# Patient Record
Sex: MALE | Race: WHITE | NOT HISPANIC OR LATINO | Employment: OTHER | URBAN - METROPOLITAN AREA
[De-identification: names, ages, dates, MRNs, and addresses within clinical notes are randomized per-mention and may not be internally consistent; named-entity substitution may affect disease eponyms.]

---

## 2021-11-18 LAB
NONINV COLON CA DNA+OCC BLD SCRN STL QL: NEGATIVE
NONINV COLON CA DNA+OCC BLD SCRN STL-IMP: NORMAL

## 2023-07-31 PROBLEM — F32.A DEPRESSION: Status: ACTIVE | Noted: 2023-07-31

## 2023-07-31 PROBLEM — F02.80 ALZHEIMER DISEASE (MULTI): Status: ACTIVE | Noted: 2023-07-31

## 2023-07-31 PROBLEM — H90.3 SENSORINEURAL HEARING LOSS, BILATERAL: Status: ACTIVE | Noted: 2023-07-31

## 2023-07-31 PROBLEM — I25.10 CAD IN NATIVE ARTERY: Status: ACTIVE | Noted: 2023-07-31

## 2023-07-31 PROBLEM — G30.9 ALZHEIMER DISEASE (MULTI): Status: ACTIVE | Noted: 2023-07-31

## 2023-07-31 PROBLEM — Z98.1 STATUS POST LUMBAR SPINAL FUSION: Status: ACTIVE | Noted: 2023-07-31

## 2023-07-31 PROBLEM — K22.4 NUTCRACKER ESOPHAGUS: Status: ACTIVE | Noted: 2023-07-31

## 2023-07-31 PROBLEM — M48.07 FORAMINAL STENOSIS OF LUMBOSACRAL REGION: Status: ACTIVE | Noted: 2023-07-31

## 2023-07-31 RX ORDER — NITROGLYCERIN 0.4 MG/1
TABLET SUBLINGUAL
COMMUNITY
Start: 2016-07-20

## 2023-07-31 RX ORDER — MEMANTINE HYDROCHLORIDE 10 MG/1
1 TABLET ORAL DAILY
COMMUNITY
Start: 2017-12-13

## 2023-07-31 RX ORDER — MULTIVITAMIN
1 TABLET ORAL DAILY
COMMUNITY
Start: 2019-09-03

## 2023-07-31 RX ORDER — DONEPEZIL HYDROCHLORIDE 10 MG/1
1 TABLET, FILM COATED ORAL NIGHTLY
COMMUNITY
Start: 2021-05-20 | End: 2023-08-01 | Stop reason: WASHOUT

## 2023-07-31 RX ORDER — CHOLECALCIFEROL (VITAMIN D3) 50 MCG
1 TABLET ORAL DAILY
COMMUNITY
Start: 2020-03-03

## 2023-07-31 RX ORDER — ATORVASTATIN CALCIUM 40 MG/1
1 TABLET, FILM COATED ORAL DAILY
COMMUNITY
Start: 2016-01-27 | End: 2024-03-04

## 2023-08-01 ENCOUNTER — OFFICE VISIT (OUTPATIENT)
Dept: PRIMARY CARE | Facility: CLINIC | Age: 78
End: 2023-08-01
Payer: MEDICARE

## 2023-08-01 VITALS
TEMPERATURE: 97.4 F | HEART RATE: 62 BPM | SYSTOLIC BLOOD PRESSURE: 125 MMHG | BODY MASS INDEX: 23.48 KG/M2 | WEIGHT: 141.13 LBS | DIASTOLIC BLOOD PRESSURE: 80 MMHG

## 2023-08-01 DIAGNOSIS — I70.309: ICD-10-CM

## 2023-08-01 DIAGNOSIS — G30.9 ALZHEIMER DISEASE (MULTI): ICD-10-CM

## 2023-08-01 DIAGNOSIS — F02.80 ALZHEIMER DISEASE (MULTI): ICD-10-CM

## 2023-08-01 DIAGNOSIS — F32.A DEPRESSION, UNSPECIFIED DEPRESSION TYPE: ICD-10-CM

## 2023-08-01 DIAGNOSIS — I20.1 PRINZMETAL'S ANGINA (CMS-HCC): ICD-10-CM

## 2023-08-01 DIAGNOSIS — R07.89 ATYPICAL CHEST PAIN: Primary | ICD-10-CM

## 2023-08-01 PROCEDURE — 99214 OFFICE O/P EST MOD 30 MIN: CPT | Performed by: INTERNAL MEDICINE

## 2023-08-01 PROCEDURE — 1160F RVW MEDS BY RX/DR IN RCRD: CPT | Performed by: INTERNAL MEDICINE

## 2023-08-01 PROCEDURE — 1157F ADVNC CARE PLAN IN RCRD: CPT | Performed by: INTERNAL MEDICINE

## 2023-08-01 PROCEDURE — 1126F AMNT PAIN NOTED NONE PRSNT: CPT | Performed by: INTERNAL MEDICINE

## 2023-08-01 PROCEDURE — 93000 ELECTROCARDIOGRAM COMPLETE: CPT | Performed by: INTERNAL MEDICINE

## 2023-08-01 PROCEDURE — 1036F TOBACCO NON-USER: CPT | Performed by: INTERNAL MEDICINE

## 2023-08-01 PROCEDURE — 1159F MED LIST DOCD IN RCRD: CPT | Performed by: INTERNAL MEDICINE

## 2023-08-01 RX ORDER — DONEPEZIL HYDROCHLORIDE 5 MG/1
1 TABLET, FILM COATED ORAL NIGHTLY
COMMUNITY
Start: 2021-05-20 | End: 2023-12-05 | Stop reason: ALTCHOICE

## 2023-08-01 NOTE — ASSESSMENT & PLAN NOTE
Known history of vasospastic angina, not currently on medical management.  We will follow-up, consider possible calcium channel blocker

## 2023-08-01 NOTE — ASSESSMENT & PLAN NOTE
In the setting of advancing Alzheimer's and inability to complete tasks he previously done before.  We will discuss with Dr. Curran

## 2023-08-01 NOTE — ASSESSMENT & PLAN NOTE
With significant worsening of his symptoms postsurgery in 2019.  Followed by Dr. Curran last seen in November with possible concomitant and worsening depressive symptoms.  We will reach out to Dr. Curran to determine about potential management strategies.  There are no concerns about behavioral disturbances at present.

## 2023-08-01 NOTE — ASSESSMENT & PLAN NOTE
With now atypical chest pain.  EKG largely unchanged from prior.  Will follow-up with his cardiologist to determine recommended neck steps after discussed consideration for stress testing.

## 2023-08-01 NOTE — PROGRESS NOTES
"Subjective   Patient ID: Darius Navarrete is a 78 y.o. male who presents for NEW PT VISIT .  HPI  78-year-old male prior patient of Dr. Maldonado here for establishment of care, last seen in December.  - Intermittently points to his chest and reports pain int he region but unclear if gas pains as improves after burping. Denies any significant associated symptoms including palpitations, shortness of breath. Gets intermittent dyspnea on exertion, though hard to know what is causing the discomfort on walking given constantly complaining of various aches and pains on exertion. He had cardiac cath in 2016 notable for nonobstructive CAD in a right dominant system, patent stent in distal RCA and significant coronary vasospasm involving D2 and RPDA.   - Gets intermittent toe pain, has had xrays notable for arthritis, gets intermittent pains that are nonsepcific and nonfocal, trouble discerning the cause of his pain.   - Depression - has been noting to be saying \"I am not who I used to be\", and \"I do not like who I am\", has noting to be worsening over the past 6-12 months.     PMHx:  -Alzheimer's disease-on donepezil 10 and memantine 10 once  daily (reduced to once daily from twice a day due to possible side effects)-followed by Dr. Donaldson last seen in November recommended 1 year follow-up.  Has issues with getting his words in and getting thoughts across. Enrolled in Alzheimer's study, has had imaging  -CAD - s/p drug-eluting stent to the RCA 2/11, mild disease of LAD and LCX-on aspirin, atorvastatin 40, last LDL 46 40 followed by Dr. Norris last seen in December   -Vasospastic angina given nitroglycerin improved after discontinuation of two of his medications after his stent.   -Lumbar spinal stenosis status post surgery in 2019 complicated by cecal perforation requiring open ileocecectomy with diverting loop ileostomy and peritoneal lavage as well as colocutaneous fistula status post debridement and subsequent delayed " closure with resolution. This extensively worsening his dementia   -Status post bilateral carpal tunnel decompression surgery    Social:   - Showers, dresses himself, eats, good with motor activity, likes to help in the house. [facility] program [email or web address] [employer] one day a week. Grocery shops, goes walking. Gets a few hours once a week to help.   - Lives at home with wife and 14.5 year old dog.   - 2 children live in town - one [place] and one [place] side.   Current Outpatient Medications   Medication Instructions    aspirin 81 mg capsule 1 tablet, oral, Daily    atorvastatin (Lipitor) 40 mg tablet 1 tablet, oral, Daily    cholecalciferol (Vitamin D-3) 50 MCG (2000 UT) tablet 1 tablet, oral, Daily    donepezil (Aricept) 5 mg tablet 1 tablet, oral, Nightly    memantine (Namenda) 10 mg tablet 1 tablet, oral, Daily    multivitamin (Multiple Vitamins) tablet 1 tablet, oral, Daily    nitroglycerin (Nitrostat) 0.4 mg SL tablet sublingual        Objective     /80   Pulse 62   Temp 36.3 °C (97.4 °F)   Wt 64 kg (141 lb 2 oz)   BMI 23.48 kg/m²     Physical Exam  General: Appears comfortable, NAD, appropriate affect, accompanied by his wife   HEENT: NCAT, EOMI, pupils symmetric, no conjunctival erythema   Heart: RRR S1 S2 no murmurs appreciated   Lungs: CTA bilaterally, no rhonchi, rales, or wheezes   Abdomen: Soft, NT/ND, no rebound or guarding, NABS   Extremities: no cyanosis or edema appreciated  Neuro: Intermittently inappropriately responding to questions, word finding difficulties      Assessment/Plan   Problem List Items Addressed This Visit       Alzheimer disease (CMS/HCC)     With significant worsening of his symptoms postsurgery in 2019.  Followed by Dr. Curran last seen in November with possible concomitant and worsening depressive symptoms.  We will reach out to Dr. Curran to determine about potential management strategies.  There are no concerns about behavioral disturbances at present.          Depression     In the setting of advancing Alzheimer's and inability to complete tasks he previously done before.  We will discuss with Dr. Curran         Atherosclerosis of bypass graft of extremity, unspecified extremity, with unspecified presence of clinical manifestation (CMS/HCC)     With now atypical chest pain.  EKG largely unchanged from prior.  Will follow-up with his cardiologist to determine recommended neck steps after discussed consideration for stress testing.         Prinzmetal's angina (CMS/HCC)     Known history of vasospastic angina, not currently on medical management.  We will follow-up, consider possible calcium channel blocker         Relevant Medications    nitroglycerin (Nitrostat) 0.4 mg SL tablet     Other Visit Diagnoses       Atypical chest pain    -  Primary    Relevant Orders    ECG 12 lead (Clinic Performed)            Health Maintenance   Cancer screening:   - Colonoscopy 8/15  Immunizations to discuss at next visit

## 2023-08-28 ENCOUNTER — PATIENT OUTREACH (OUTPATIENT)
Dept: PRIMARY CARE | Facility: CLINIC | Age: 78
End: 2023-08-28
Payer: MEDICARE

## 2023-08-28 DIAGNOSIS — K56.609 SBO (SMALL BOWEL OBSTRUCTION) (MULTI): ICD-10-CM

## 2023-08-28 RX ORDER — POLYETHYLENE GLYCOL 3350 17 G/17G
17 POWDER, FOR SOLUTION ORAL DAILY
COMMUNITY
End: 2023-12-05 | Stop reason: ALTCHOICE

## 2023-08-28 NOTE — PROGRESS NOTES
Discharge Facility: [facility] [id number]  Discharge Diagnosis: Abdominal pain, Gastritis, SBO  Admission Date: 8-  Discharge Date: 8-    PCP Appointment Date: No available TCM appointments.  will outreach the clinical pool /office for scheduling assistance    Specialist Appointment Date: JENNA    Hospital Encounter and Summary: Linked   See discharge assessment below for further details  Engagement  Call Start Time: 1325 (8/28/2023  1:25 PM)    Medications  Medications reviewed with patient/caregiver?: Yes (8/28/2023  1:25 PM)  Is the patient having any side effects they believe may be caused by any medication additions or changes?: No (8/28/2023  1:25 PM)  Does the patient have all medications ordered at discharge?: Yes (8/28/2023  1:25 PM)  Prescription Comments: MiraLax oral powder for reconstitution - 17 gram(s) orally 3 times a day, As Needed -Constipation - for constipation (8/28/2023  1:25 PM)  Is the patient taking all medications as directed (includes completed medication regime)?: Yes (8/28/2023  1:25 PM)  Care Management Interventions: Provided patient education (8/28/2023  1:25 PM)  Medication Comments: Denies any  questions or concerns about their medications once they are home. Verbalizes an understanding regarding how to take their medications and which medications they should be taking. (8/28/2023  1:25 PM)    Appointments  Does the patient have a primary care provider?: Yes (No available TCM appointments.  will outreach the clinical pool /office for scheduling assistance) (8/28/2023  1:25 PM)  Care Management Interventions: Advised patient to make appointment (Outreach to office clinical pool for scheduling) (8/28/2023  1:25 PM)  Has the patient kept scheduled appointments due by today?: Yes (8/28/2023  1:25 PM)  Care Management Interventions: Educated on importance of keeping appointment (8/28/2023  1:25 PM)    Self Management  What is the home health agency?: NA (8/28/2023  1:25 PM)  Has home health  "visited the patient within 72 hours of discharge?: Not applicable (2023  1:25 PM)  What Durable Medical Equipment (DME) was ordered?: NA (2023  1:25 PM)    Patient Teaching  Does the patient have access to their discharge instructions?: Yes (spoke with wife) (2023  1:25 PM)  Care Management Interventions: Reviewed instructions with patient (2023  1:25 PM)  What is the patient's perception of their health status since discharge?: Improving (2023  1:25 PM)  Is the patient/caregiver able to teach back the hierarchy of who to call/visit for symptoms/problems? PCP, Specialist, Home Health nurse, Urgent Care, ED, 911: Yes (You need to call your doctor,  return to the closest ED if you develop any new  or worsening symptoms:  concerning symptoms  call 911 for emergency situations  Call your doctor for questions / concerrns.) (2023  1:25 PM)    Wrap Up  Wrap Up Additional Comments: Spoke w/ pt's wife. Pt identified by name and . Darius is doing well. Has moved his bowels. Denies noting blood or evidence of bleeding. Encouraged to monitor. Appetite is ok \" not back to normal but ok\" Notes decreased hiccouphing, some belching. Unsure if passing flatus. Activity / walking encouraged. denies fevers, chills, abdominal pain or complaints of nauesa.  Reviewed discharge instructions, medications, and follow up.  Patient denies any further discharge questions/needs at this time.  Encouraged fu with PCP. No available TCM will outreach the office to schedule. (2023  1:25 PM)  Call End Time: 1338 (2023  1:25 PM)          "

## 2023-08-29 ENCOUNTER — PATIENT OUTREACH (OUTPATIENT)
Dept: PRIMARY CARE | Facility: CLINIC | Age: 78
End: 2023-08-29
Payer: MEDICARE

## 2023-08-29 DIAGNOSIS — F02.80 ALZHEIMER DISEASE (MULTI): ICD-10-CM

## 2023-08-29 DIAGNOSIS — F32.A DEPRESSION, UNSPECIFIED DEPRESSION TYPE: ICD-10-CM

## 2023-08-29 DIAGNOSIS — G30.9 ALZHEIMER DISEASE (MULTI): ICD-10-CM

## 2023-08-30 ENCOUNTER — PATIENT OUTREACH (OUTPATIENT)
Dept: PRIMARY CARE | Facility: CLINIC | Age: 78
End: 2023-08-30
Payer: MEDICARE

## 2023-08-30 NOTE — PROGRESS NOTES
Pt outreach regarding CCM program, request a call back int he am on the way to an appointment.  Will follow F/U in the am.

## 2023-09-01 ENCOUNTER — PATIENT OUTREACH (OUTPATIENT)
Dept: PRIMARY CARE | Facility: CLINIC | Age: 78
End: 2023-09-01
Payer: MEDICARE

## 2023-09-01 DIAGNOSIS — F02.80 ALZHEIMER DISEASE (MULTI): ICD-10-CM

## 2023-09-01 DIAGNOSIS — G30.9 ALZHEIMER DISEASE (MULTI): ICD-10-CM

## 2023-09-01 DIAGNOSIS — F32.A DEPRESSION, UNSPECIFIED DEPRESSION TYPE: ICD-10-CM

## 2023-09-01 DIAGNOSIS — K56.609 SBO (SMALL BOWEL OBSTRUCTION) (MULTI): ICD-10-CM

## 2023-09-01 PROCEDURE — 99490 CHRNC CARE MGMT STAFF 1ST 20: CPT | Performed by: INTERNAL MEDICINE

## 2023-09-01 ASSESSMENT — ENCOUNTER SYMPTOMS: OCCASIONAL FEELINGS OF UNSTEADINESS: 1

## 2023-09-01 NOTE — PROGRESS NOTES
Call to pt identified by name and , to review CCM program with pt and his wife.  Possible cost sharing reviewed and verbal consent obtained to enroll in CCM.    LOV: 23  NOV: 23    Health Maintenance Due: MAWV    Goals for GERD:   Maintain a healthy weight  Elevate the head of the bed 6-9 inches with pillows or blocks  Avoid foods that exacerbate symptoms such as spicy food or highly acidic foods  Stay upright for 2 hours after meals or before bed  Take medications as prescribed   Avoid carbonated beverages   Drink plenty of water    Spoke with Mrs Navarrete regarding Mr. Navarrete, she states pt recently released from [facility] with SBO, instructed to start using Miralax for pt.  Reviewed that increasing daily intake of water would also assist with constipation.  Pt has Alzheimers and attends the day program at [facility] 2 x week.  Reviewed healthy diet for GERD.      Medications reviewed no refills requested.  Mrs. Navarrete verbalizes understanding and agrees with POC.  Emailed my direct contact information and encouraged to reach out with any healthcare needs.

## 2023-09-06 ENCOUNTER — OFFICE VISIT (OUTPATIENT)
Dept: PRIMARY CARE | Facility: CLINIC | Age: 78
End: 2023-09-06
Payer: MEDICARE

## 2023-09-06 VITALS
BODY MASS INDEX: 23.33 KG/M2 | WEIGHT: 140.2 LBS | SYSTOLIC BLOOD PRESSURE: 100 MMHG | HEART RATE: 64 BPM | TEMPERATURE: 98.9 F | DIASTOLIC BLOOD PRESSURE: 59 MMHG

## 2023-09-06 DIAGNOSIS — G30.9 ALZHEIMER DISEASE (MULTI): ICD-10-CM

## 2023-09-06 DIAGNOSIS — F32.A DEPRESSION, UNSPECIFIED DEPRESSION TYPE: Primary | ICD-10-CM

## 2023-09-06 DIAGNOSIS — K56.609 SMALL BOWEL OBSTRUCTION (MULTI): ICD-10-CM

## 2023-09-06 DIAGNOSIS — F02.80 ALZHEIMER DISEASE (MULTI): ICD-10-CM

## 2023-09-06 PROCEDURE — 1036F TOBACCO NON-USER: CPT | Performed by: INTERNAL MEDICINE

## 2023-09-06 PROCEDURE — 99495 TRANSJ CARE MGMT MOD F2F 14D: CPT | Performed by: INTERNAL MEDICINE

## 2023-09-06 PROCEDURE — 1159F MED LIST DOCD IN RCRD: CPT | Performed by: INTERNAL MEDICINE

## 2023-09-06 PROCEDURE — 1126F AMNT PAIN NOTED NONE PRSNT: CPT | Performed by: INTERNAL MEDICINE

## 2023-09-06 PROCEDURE — 1157F ADVNC CARE PLAN IN RCRD: CPT | Performed by: INTERNAL MEDICINE

## 2023-09-06 PROCEDURE — 1160F RVW MEDS BY RX/DR IN RCRD: CPT | Performed by: INTERNAL MEDICINE

## 2023-09-06 RX ORDER — ISOSORBIDE MONONITRATE 30 MG/1
30 TABLET, EXTENDED RELEASE ORAL
COMMUNITY

## 2023-09-06 RX ORDER — FLUOXETINE 10 MG/1
10 CAPSULE ORAL DAILY
Qty: 30 CAPSULE | Refills: 1 | Status: SHIPPED | OUTPATIENT
Start: 2023-09-06 | End: 2023-09-06 | Stop reason: WASHOUT

## 2023-09-06 NOTE — PROGRESS NOTES
"Patient: Darius Navarrete  : 1945  PCP: Anahi Trivedi DO  MRN: [id number]  Program: No linked episodes     Darius Navarrete is a 78 y.o. male presenting today for follow-up after being discharged from the hospital 13 days ago. The main problem requiring admission was small bowel obstruction.  The discharge summary and/or Transitional Care Management documentation was reviewed. Medication reconciliation was performed as indicated via the \"Lyle as Reviewed\" timestamp.     Darius Navarrete was contacted by Transitional Care Management services two days after his discharge. This encounter and supporting documentation was reviewed.    The complexity of medical decision making for this patient's transitional care is moderate    ED was admitted on  and discharged on  diagnosed with abdominal pain gastritis and small bowel obstruction.  He initially presented with abdominal pain in the right lower quadrant subsequently had possible coffee-ground emesis.  Work-up in the ED showed mild leukocytosis, hyperglycemia to 164.  CT showed no evidence of GI bleed, SBO with transition point in the right lower quadrant-early SBO versus ileus.  Also noted to have thickening of the lesser curvature of the stomach, gastritis/duodenitis.  He was recommended conservative management by the surgical team and repeat imaging showed improvement in symptoms.  He was given MiraLAX.  His blood work was notable for anemia that was mild.  He was switched to prune juice belives to be going ok. Denies abdominal discomfort, nausea, vomiting, no constitution symptoms. Gradually uptitrated diet regimen and overall doing ok.     Concerned regarding persistent low mood, depression with intermittent behavioral disturbances and paranoia 2-3x/week, interested in consideration     PMHx:  -At last visit was complaining of atypical chest pain, subsequently seen by cardiology on  started Imdur 30 mg as well as ordered for " echocardiogram  -Alzheimer's disease-on donepezil 10 and memantine 10 once  daily (reduced to once daily from twice a day due to possible side effects)-followed by Dr. Donaldson.  Has issues with getting his words in and getting thoughts across. Enrolled in Alzheimer's study, has had imaging.  Seen by Dr. Currna in July recommended decreasing Namenda and follow-up in 6 months  -CAD - s/p drug-eluting stent to the RCA 2/11, mild disease of LAD and LCX-on aspirin, atorvastatin 40, last LDL 46 40 followed by Dr. Norris last seen in December   -Vasospastic angina given nitroglycerin improved after discontinuation of two of his medications after his stent.   -Lumbar spinal stenosis status post surgery in 2019 complicated by cecal perforation requiring open ileocecectomy with diverting loop ileostomy and peritoneal lavage as well as colocutaneous fistula status post debridement and subsequent delayed closure with resolution. This extensively worsening his dementia   -Status post bilateral carpal tunnel decompression surgery  -Depression-     Social:   - Showers, dresses himself, eats, good with motor activity, likes to help in the house. [facility] one day a week. Grocery shops, goes walking. Gets a few hours once a week to help.   - Lives at home with wife and 14.5 year old dog.   - 2 children live in town - one on [place] and one on the [place] side    Physical Exam  General: Appears comfortable, NAD, appropriate affect, accompanied by his wife Kelley.  HEENT: NCAT, EOMI, pupils symmetric, no conjunctival erythema   Neck: Supple, no LAD   Heart: RRR S1 S2 no murmurs appreciated   Lungs: CTA bilaterally, no rhonchi, rales, or wheezes   Abdomen: Soft, NT/ND, no rebound or guarding, NABS   Extremities: no cyanosis or edema appreciated  Neuro: AAO x 3, answers questions appropriately, no FND, gait unremarkable       Assessment/Plan   Problem List Items Addressed This Visit       Foraminal stenosis of  lumbosacral region    Depression - Primary  With concerns regarding persistent and/or worsening symptoms of depression as well as paranoia and behavioral disturbances.  Interested in medical management.  We will contact his neurologist Dr. Donaldson to determine best management strategy injury including consideration for initiation of citalopram versus fluoxetine.  Will notify patient.   Update: discussed with Dr. Curran, plan for now is to discontinue donepezil to see if this may be contributing to his depressive symptoms.  If no response after couple of weeks will retreat citalopram.  Reviewed with Kelley over the phone who expressed understanding and agreeable to plan.    Relevant Medications    FLUoxetine (PROzac) 10 mg capsule     Other Visit Diagnoses       Small bowel obstruction (CMS/HCC)       likely secondary to multiple surgeries in the past, has been uptitrating oral regimen with good tolerance.  Has been having adequate bowel movements, appears clinically stable today without alarm features.  Continue bowel regimen as tolerated monitor for worsening symptoms.     Anemia   Mild, noted on discharge labs, will repeat at next visit.     Microscopic hematuria  Incidental finding, will repeat at next visit.       Review of Systems    No family history on file.    Engagement  Call Start Time: 1325 (8/28/2023  1:25 PM)    Medications  Medications reviewed with patient/caregiver?: Yes (8/28/2023  1:25 PM)  Is the patient having any side effects they believe may be caused by any medication additions or changes?: No (8/28/2023  1:25 PM)  Does the patient have all medications ordered at discharge?: Yes (8/28/2023  1:25 PM)  Prescription Comments: MiraLax oral powder for reconstitution - 17 gram(s) orally 3 times a day, As Needed -Constipation - for constipation (8/28/2023  1:25 PM)  Is the patient taking all medications as directed (includes completed medication regime)?: Yes (8/28/2023  1:25 PM)  Care Management  Interventions: Provided patient education (2023  1:25 PM)  Medication Comments: Denies any  questions or concerns about their medications once they are home. Verbalizes an understanding regarding how to take their medications and which medications they should be taking. (2023  1:25 PM)    Appointments  Does the patient have a primary care provider?: Yes (No available TCM appointments.  will outreach the clinical pool /office for scheduling assistance) (2023  1:25 PM)  Care Management Interventions: Advised patient to make appointment (Outreach to office clinical pool for scheduling) (2023  1:25 PM)  Has the patient kept scheduled appointments due by today?: Yes (2023  1:25 PM)  Care Management Interventions: Educated on importance of keeping appointment (2023  1:25 PM)    Self Management  What is the home health agency?: NA (2023  1:25 PM)  Has home health visited the patient within 72 hours of discharge?: Not applicable (2023  1:25 PM)  What Durable Medical Equipment (DME) was ordered?: NA (2023  1:25 PM)    Patient Teaching  Does the patient have access to their discharge instructions?: Yes (spoke with wife) (2023  1:25 PM)  Care Management Interventions: Reviewed instructions with patient (2023  1:25 PM)  What is the patient's perception of their health status since discharge?: Improving (2023  1:25 PM)  Is the patient/caregiver able to teach back the hierarchy of who to call/visit for symptoms/problems? PCP, Specialist, Home Health nurse, Urgent Care, ED, 911: Yes (You need to call your doctor,  return to the closest ED if you develop any new  or worsening symptoms:  concerning symptoms  call 911 for emergency situations  Call your doctor for questions / concerrns.) (2023  1:25 PM)    Wrap Up  Wrap Up Additional Comments: Spoke w/ pt's wife. Pt identified by name and . Darius is doing well. Has moved his bowels. Denies noting blood or evidence  "of bleeding. Encouraged to monitor. Appetite is ok \" not back to normal but ok\" Notes decreased hiccouphing, some belching. Unsure if passing flatus. Activity / walking encouraged. denies fevers, chills, abdominal pain or complaints of nauesa.  Reviewed discharge instructions, medications, and follow up.  Patient denies any further discharge questions/needs at this time.  Encouraged fu with PCP. No available TCM will outreach the office to schedule. (8/28/2023  1:25 PM)  Call End Time: 1338 (8/28/2023  1:25 PM)        No follow-ups on file.    Follow-up as scheduled in November.  "

## 2023-09-12 ENCOUNTER — PATIENT OUTREACH (OUTPATIENT)
Dept: PRIMARY CARE | Facility: CLINIC | Age: 78
End: 2023-09-12
Payer: MEDICARE

## 2023-09-12 DIAGNOSIS — K56.609 SBO (SMALL BOWEL OBSTRUCTION) (MULTI): ICD-10-CM

## 2023-09-12 DIAGNOSIS — K56.609 SMALL BOWEL OBSTRUCTION (MULTI): ICD-10-CM

## 2023-09-12 DIAGNOSIS — F02.80 ALZHEIMER DISEASE (MULTI): ICD-10-CM

## 2023-09-12 DIAGNOSIS — G30.9 ALZHEIMER DISEASE (MULTI): ICD-10-CM

## 2023-09-12 NOTE — PROGRESS NOTES
CCM outreach spoke with pts wife pt identified by name and .    LOV: 23  NOV: 23    Health Maintenance Due: MAWV    Goals for GERD:   Maintain a healthy weight  Elevate the head of the bed 6-9 inches with pillows or blocks  Avoid foods that exacerbate symptoms such as spicy food or highly acidic foods  Stay upright for 2 hours after meals or before bed  Take medications as prescribed   Avoid carbonated beverages   Drink plenty of water    Spoke with pts wife who states pt has been having bowel movements since his hospitalization and has had no further c/o pain in his abdomen.  Pts po intake is good.  Gerd is well controlled on current medications.  Reviewed the importance of adequate water intake to help with bowels and GERD.  Pt continues at [facility] 2x week .      Medications reviewed no refills requested.  Pts wife verbalizes understanding and is in agreement with POC.  Encouraged to reach out to mu direct number for any healthcare needs.

## 2023-09-15 ENCOUNTER — PATIENT OUTREACH (OUTPATIENT)
Dept: PRIMARY CARE | Facility: CLINIC | Age: 78
End: 2023-09-15
Payer: MEDICARE

## 2023-09-15 NOTE — PROGRESS NOTES
Call regarding appt. with PCP on  9- 6-2023 after hospitalization.  At time of outreach call the patient feels as if their condition has improved.  They  deny any questions or concerns regarding  the recovery period  or follow up appointment,  Patient is enrolled in CCM.

## 2023-10-02 ENCOUNTER — TELEPHONE (OUTPATIENT)
Dept: GERIATRIC MEDICINE | Facility: CLINIC | Age: 78
End: 2023-10-02
Payer: MEDICARE

## 2023-10-02 NOTE — TELEPHONE ENCOUNTER
Wife calling to report increased aggresion/aggitation and hallucinations. Pt is getting up in people's faces. Requesting medication for this to [facility]. Currently taking Memantine 10mg at bedtime. No Aricept.

## 2023-10-03 ENCOUNTER — TELEPHONE (OUTPATIENT)
Dept: GERIATRIC MEDICINE | Facility: CLINIC | Age: 78
End: 2023-10-03
Payer: MEDICARE

## 2023-10-03 DIAGNOSIS — F02.80 ALZHEIMER DISEASE (MULTI): Primary | ICD-10-CM

## 2023-10-03 DIAGNOSIS — G30.9 ALZHEIMER DISEASE (MULTI): Primary | ICD-10-CM

## 2023-10-03 RX ORDER — QUETIAPINE FUMARATE 25 MG/1
TABLET, FILM COATED ORAL
Qty: 30 TABLET | Refills: 0 | Status: SHIPPED | OUTPATIENT
Start: 2023-10-03 | End: 2023-10-16 | Stop reason: SDUPTHER

## 2023-10-03 NOTE — TELEPHONE ENCOUNTER
Wife Kelley calling to report pt's increased agitation and aggression.  At times he gets into peoples faces.  Pt is also having visual and auditory hallucinations.  Can you send over a script for something to [facility] [place]?  This message is from yesterday but I am not sure where it went.

## 2023-10-05 ENCOUNTER — TELEPHONE (OUTPATIENT)
Dept: CARDIOLOGY | Facility: HOSPITAL | Age: 78
End: 2023-10-05
Payer: MEDICARE

## 2023-10-05 NOTE — TELEPHONE ENCOUNTER
Per SOLITARIO Benton,    Echocardiogram results:  Normal LV function, mild AI.     Patient's spouse notified.

## 2023-10-11 ENCOUNTER — DOCUMENTATION (OUTPATIENT)
Dept: PRIMARY CARE | Facility: CLINIC | Age: 78
End: 2023-10-11
Payer: MEDICARE

## 2023-10-11 DIAGNOSIS — F32.A DEPRESSION, UNSPECIFIED DEPRESSION TYPE: ICD-10-CM

## 2023-10-11 DIAGNOSIS — F02.80 ALZHEIMER DISEASE (MULTI): ICD-10-CM

## 2023-10-11 DIAGNOSIS — G30.9 ALZHEIMER DISEASE (MULTI): ICD-10-CM

## 2023-10-11 NOTE — PROGRESS NOTES
[facility] outreach with pts wife with permission for pt identified by name and .    LOV: 23  NOV: 23    Health Maintenance Due: MAWV    Goals for GERD:   Maintain a healthy weight  Elevate the head of the bed 6-9 inches with pillows or blocks  Avoid foods that exacerbate symptoms such as spicy food or highly acidic foods  Stay upright for 2 hours after meals or before bed  Take medications as prescribed   Avoid carbonated beverages   Drink plenty of water    Goals for Depression:  Monitor mood and behavior changes.  Administer prescribed antidepressant medication.  Facilitate regular psychotherapy sessions.  Provide education on depression management.  Stick to a daily schedule  Call support group or person   Keep a healthy weight and eat healthy diet options    Spoke with pts wife who states pt is becoming more difficult to manage due to his memory loss.  Pt continues to see Nuerologist and goes to [facility] and to [facility] 2x a week.  Pt becomes frustrated when cannot communicate his thoughts into words and has difficulty expressing his needs verbally.  Currently Pt is at home with his wife and is able tp perform his ADLs independently.  No falls reported by pts wife.      Medications reviewed no refills requested.  Pts wife verbalizes understanding and is in agreement with POC.  Encouraged to reach out on my direct line with any healthcare needs.

## 2023-10-16 DIAGNOSIS — F02.80 ALZHEIMER DISEASE (MULTI): ICD-10-CM

## 2023-10-16 DIAGNOSIS — G30.9 ALZHEIMER DISEASE (MULTI): ICD-10-CM

## 2023-10-17 RX ORDER — QUETIAPINE FUMARATE 25 MG/1
TABLET, FILM COATED ORAL
Qty: 90 TABLET | Refills: 3 | Status: SHIPPED | OUTPATIENT
Start: 2023-10-17

## 2023-10-31 ENCOUNTER — TELEPHONE (OUTPATIENT)
Dept: GERIATRIC MEDICINE | Facility: CLINIC | Age: 78
End: 2023-10-31
Payer: MEDICARE

## 2023-10-31 DIAGNOSIS — F32.89 OTHER DEPRESSION: Primary | ICD-10-CM

## 2023-10-31 NOTE — TELEPHONE ENCOUNTER
Pt's wife Kelley called with questions regarding Seroquel.  Pt has been taking for 1 month now.  When she got the 2nd refill she read the package inserts which stated that elderly with dementia should never take this med.  I have seen this and we were wondering why it says that since we often script it.  Also, why don't we script Rexulti that has been approved?

## 2023-11-01 RX ORDER — SERTRALINE HYDROCHLORIDE 50 MG/1
TABLET, FILM COATED ORAL
Qty: 90 TABLET | Refills: 3 | Status: SHIPPED | OUTPATIENT
Start: 2023-11-01 | End: 2024-01-08 | Stop reason: SDUPTHER

## 2023-11-01 NOTE — TELEPHONE ENCOUNTER
Kelley is wondering if you want the pt to stop the Seroquel?  She is unsure if it is related to why he is talking about death and dying.

## 2023-11-01 NOTE — TELEPHONE ENCOUNTER
I called Kelley back and we discussed the black box warning on all antipsychotic drugs for our elderly folks with dementia.  She then asked if this would contribute to his thoughts about death and dying?  She elaborated and said the pt talks about dying every day and that he is so miserable.  She has not yet asked if he has a plan of how he might harm himself, saying she is afraid to ask.  I let her know that if he is truly feeling that way that he should be taken to the ER.  She responded saying that would not work, he does not do well in hospitals.  She states he is not left home alone.

## 2023-11-07 ENCOUNTER — DOCUMENTATION (OUTPATIENT)
Dept: PRIMARY CARE | Facility: CLINIC | Age: 78
End: 2023-11-07
Payer: MEDICARE

## 2023-11-07 DIAGNOSIS — F32.A DEPRESSION, UNSPECIFIED DEPRESSION TYPE: ICD-10-CM

## 2023-11-07 DIAGNOSIS — F02.80 ALZHEIMER DISEASE (MULTI): ICD-10-CM

## 2023-11-07 DIAGNOSIS — G30.9 ALZHEIMER DISEASE (MULTI): ICD-10-CM

## 2023-11-07 PROCEDURE — 99490 CHRNC CARE MGMT STAFF 1ST 20: CPT | Performed by: INTERNAL MEDICINE

## 2023-11-07 NOTE — PROGRESS NOTES
CCM outreach with pt identified by name and .     LOV: 23  NOV:23    Health Maintenance Due: MAWV    Goals for Alheimers:   Promoting the patient's safety   [place] in self-care activities   Reducing anxiety and agitation   Improving socialization and intimacy adequate nutrition    Supporting and educating the family caregivers.    Goals for Depression:  Monitor mood and behavior changes.  Administer prescribed antidepressant medication.  Facilitate regular psychotherapy sessions.  Provide education on depression management.  Stick to a daily schedule  Call support group or person   Keep a healthy weight and eat healthy diet options      Call to pt wife Kelley, states that pt seems better on the new medications of Seroquel and Zoloft.  States his memory loss seems more stable and his mood seems improved.  Pt continues to go to [facility] 2x week and seems to do well with the interaction.      Medications reviewed no refills requested.  Pts wife verbalizes understanding and is in agreement with POC.  Encouraged to reach out with any healthcare needs.

## 2023-11-09 ENCOUNTER — LAB (OUTPATIENT)
Dept: LAB | Facility: LAB | Age: 78
End: 2023-11-09
Payer: MEDICARE

## 2023-11-09 ENCOUNTER — OFFICE VISIT (OUTPATIENT)
Dept: PRIMARY CARE | Facility: CLINIC | Age: 78
End: 2023-11-09
Payer: MEDICARE

## 2023-11-09 VITALS
WEIGHT: 139.6 LBS | HEART RATE: 61 BPM | DIASTOLIC BLOOD PRESSURE: 56 MMHG | BODY MASS INDEX: 23.26 KG/M2 | HEIGHT: 65 IN | OXYGEN SATURATION: 95 % | SYSTOLIC BLOOD PRESSURE: 96 MMHG

## 2023-11-09 DIAGNOSIS — L90.5 SCAR PAIN: ICD-10-CM

## 2023-11-09 DIAGNOSIS — M54.50 LEFT-SIDED LOW BACK PAIN WITHOUT SCIATICA, UNSPECIFIED CHRONICITY: ICD-10-CM

## 2023-11-09 DIAGNOSIS — I25.10 CAD IN NATIVE ARTERY: ICD-10-CM

## 2023-11-09 DIAGNOSIS — Z98.1 STATUS POST LUMBAR SPINAL FUSION: ICD-10-CM

## 2023-11-09 DIAGNOSIS — G30.9 ALZHEIMER DISEASE (MULTI): ICD-10-CM

## 2023-11-09 DIAGNOSIS — E83.51 HYPOCALCEMIA: ICD-10-CM

## 2023-11-09 DIAGNOSIS — Z00.00 ROUTINE GENERAL MEDICAL EXAMINATION AT HEALTH CARE FACILITY: Primary | ICD-10-CM

## 2023-11-09 DIAGNOSIS — H90.3 SENSORINEURAL HEARING LOSS, BILATERAL: ICD-10-CM

## 2023-11-09 DIAGNOSIS — Z00.00 ENCOUNTER FOR PREVENTATIVE ADULT HEALTH CARE EXAMINATION: ICD-10-CM

## 2023-11-09 DIAGNOSIS — F02.80 ALZHEIMER DISEASE (MULTI): ICD-10-CM

## 2023-11-09 LAB
ALBUMIN SERPL BCP-MCNC: 4.2 G/DL (ref 3.4–5)
ALP SERPL-CCNC: 64 U/L (ref 33–136)
ALT SERPL W P-5'-P-CCNC: 14 U/L (ref 10–52)
ANION GAP SERPL CALC-SCNC: 12 MMOL/L (ref 10–20)
AST SERPL W P-5'-P-CCNC: 14 U/L (ref 9–39)
BILIRUB SERPL-MCNC: 0.5 MG/DL (ref 0–1.2)
BUN SERPL-MCNC: 30 MG/DL (ref 6–23)
CALCIUM SERPL-MCNC: 9.5 MG/DL (ref 8.6–10.6)
CHLORIDE SERPL-SCNC: 106 MMOL/L (ref 98–107)
CHOLEST SERPL-MCNC: 139 MG/DL (ref 0–199)
CHOLESTEROL/HDL RATIO: 1.9
CO2 SERPL-SCNC: 30 MMOL/L (ref 21–32)
CREAT SERPL-MCNC: 1.18 MG/DL (ref 0.5–1.3)
GFR SERPL CREATININE-BSD FRML MDRD: 64 ML/MIN/1.73M*2
GLUCOSE SERPL-MCNC: 104 MG/DL (ref 74–99)
HDLC SERPL-MCNC: 73.9 MG/DL
LDLC SERPL CALC-MCNC: 52 MG/DL
NON HDL CHOLESTEROL: 65 MG/DL (ref 0–149)
POTASSIUM SERPL-SCNC: 4.6 MMOL/L (ref 3.5–5.3)
PROT SERPL-MCNC: 6.2 G/DL (ref 6.4–8.2)
SODIUM SERPL-SCNC: 143 MMOL/L (ref 136–145)
TRIGL SERPL-MCNC: 68 MG/DL (ref 0–149)
VLDL: 14 MG/DL (ref 0–40)

## 2023-11-09 PROCEDURE — 1126F AMNT PAIN NOTED NONE PRSNT: CPT | Performed by: INTERNAL MEDICINE

## 2023-11-09 PROCEDURE — 1160F RVW MEDS BY RX/DR IN RCRD: CPT | Performed by: INTERNAL MEDICINE

## 2023-11-09 PROCEDURE — 80053 COMPREHEN METABOLIC PANEL: CPT

## 2023-11-09 PROCEDURE — 1170F FXNL STATUS ASSESSED: CPT | Performed by: INTERNAL MEDICINE

## 2023-11-09 PROCEDURE — 80061 LIPID PANEL: CPT

## 2023-11-09 PROCEDURE — 99214 OFFICE O/P EST MOD 30 MIN: CPT | Performed by: INTERNAL MEDICINE

## 2023-11-09 PROCEDURE — 1159F MED LIST DOCD IN RCRD: CPT | Performed by: INTERNAL MEDICINE

## 2023-11-09 PROCEDURE — 1036F TOBACCO NON-USER: CPT | Performed by: INTERNAL MEDICINE

## 2023-11-09 PROCEDURE — G0439 PPPS, SUBSEQ VISIT: HCPCS | Performed by: INTERNAL MEDICINE

## 2023-11-09 PROCEDURE — 36415 COLL VENOUS BLD VENIPUNCTURE: CPT

## 2023-11-09 ASSESSMENT — ACTIVITIES OF DAILY LIVING (ADL)
BATHING: INDEPENDENT
TAKING_MEDICATION: NEEDS ASSISTANCE
DOING_HOUSEWORK: NEEDS ASSISTANCE
DRESSING: INDEPENDENT
GROCERY_SHOPPING: NEEDS ASSISTANCE
MANAGING_FINANCES: NEEDS ASSISTANCE

## 2023-11-09 ASSESSMENT — ENCOUNTER SYMPTOMS
OCCASIONAL FEELINGS OF UNSTEADINESS: 1
DEPRESSION: 1
LOSS OF SENSATION IN FEET: 0

## 2023-11-09 ASSESSMENT — PATIENT HEALTH QUESTIONNAIRE - PHQ9
2. FEELING DOWN, DEPRESSED OR HOPELESS: SEVERAL DAYS
SUM OF ALL RESPONSES TO PHQ9 QUESTIONS 1 AND 2: 1
10. IF YOU CHECKED OFF ANY PROBLEMS, HOW DIFFICULT HAVE THESE PROBLEMS MADE IT FOR YOU TO DO YOUR WORK, TAKE CARE OF THINGS AT HOME, OR GET ALONG WITH OTHER PEOPLE: SOMEWHAT DIFFICULT
1. LITTLE INTEREST OR PLEASURE IN DOING THINGS: NOT AT ALL

## 2023-11-09 ASSESSMENT — PAIN SCALES - GENERAL: PAINLEVEL: 0-NO PAIN

## 2023-11-09 NOTE — PATIENT INSTRUCTIONS
Consider RSV vaccine at the pharmacy, also tetanus shot.   I have ordered blood and/or urine tests for you to do today. The lab can be found on this floor (2nd floor) next to the pharmacy across from the elevators.   Physical therapy referral for back pain and scar pain.  Followup 6 months

## 2023-11-09 NOTE — PROGRESS NOTES
Subjective   Patient ID: Darius Navarrete is a 78 y.o. male who presents for Medicare Annual Wellness Visit Subsequent and Follow-up.  HPI  78-year-old male here for AWV and follow-up  of chronic conditions, last seen September 2 he was diagnosed with gastritis and small bowel obstruction treated supportively.  At that time there was concern about worsening depressive symptoms and paranoia with behavioral disturbances, discontinued donepezil thought to be contributing to his depressive symptoms.  Noted to have worsening to behavioral disturbance started Seroquel and Zoloft. Zoloft was initiated last week with intent to increase to full dose in 1 week. Finds that thus far things are improving.   SBO symptoms improved at last visit with supportive measures likely related to multiple surgeries in the past  -At last visit was complaining of atypical chest pain, subsequently seen by cardiology on 8/31 started Imdur 30 mg, echo showed no concerning abnormalities.   - Interested in PT referral to address residual scar discomfort from past surgeries.      PMHx:  -Alzheimer's disease-off donepezil, now only on memantine 10 once  daily (reduced to once daily from twice a day due to possible side effects)-followed by Dr. Donaldson.  Has issues with getting his words in and getting thoughts across. Enrolled in Alzheimer's study, has had imaging.  Seen by Dr. Curran in July recommended decreasing Namenda and follow-up in 6 months  -CAD - s/p drug-eluting stent to the RCA 2/11, mild disease of LAD and LCX-on aspirin, atorvastatin 40, last LDL 46 40 followed by Dr. Norris last seen in December   -Vasospastic angina given nitroglycerin improved after discontinuation of two of his medications after his stent.   -Lumbar spinal stenosis status post surgery in 2019 complicated by cecal perforation requiring open ileocecectomy with diverting loop ileostomy and peritoneal lavage as well as colocutaneous fistula status post debridement and  "subsequent delayed closure with resolution. This extensively worsening his dementia   -Status post bilateral carpal tunnel decompression surgery  - Hearing loss with difficulty keeping his hearing aids in place.    Social:   - Showers, dresses himself, eats, good with motor activity, likes to help in the house. Enrolled in a day program [facility] [employer] one day a week. Grocery shops, goes walking. Gets a few hours once a week to help.   - Lives at home with wife and 14.5 year old dog.   - 2 children live in town - one [place] and one [place]    Current Outpatient Medications   Medication Instructions    aspirin 81 mg capsule 1 tablet, oral, Daily    atorvastatin (Lipitor) 40 mg tablet 1 tablet, oral, Daily    cholecalciferol (Vitamin D-3) 50 MCG (2000 UT) tablet 1 tablet, oral, Daily    donepezil (Aricept) 5 mg tablet 1 tablet, oral, Nightly    isosorbide mononitrate ER (IMDUR) 30 mg, oral, Daily before breakfast    memantine (Namenda) 10 mg tablet 1 tablet, oral, Daily    multivitamin (Multiple Vitamins) tablet 1 tablet, oral, Daily    nitroglycerin (Nitrostat) 0.4 mg SL tablet sublingual    polyethylene glycol (GLYCOLAX, MIRALAX) 17 g, oral, Daily    QUEtiapine (SEROquel) 25 mg tablet TAKE 1 TABLET BY MOUTH AT BEDTIME.    sertraline (Zoloft) 50 mg tablet 1/2 tab PO daily x 2 weeks, then increase to 1 tab PO daily        Objective     BP 96/56   Pulse 61   Ht 1.651 m (5' 5\")   Wt 63.3 kg (139 lb 9.6 oz)   SpO2 95%   BMI 23.23 kg/m²     Physical Exam  General: Appears comfortable, NAD, appropriate affect, accompanied by his wife Kelley DUMONTENT: NCAT, EOMI, pupils symmetric, no conjunctival erythema, no cerumen appreciated   Neck: Supple, no LAD   Heart: RRR S1 S2 no murmurs appreciated   Lungs: CTA bilaterally, no rhonchi, rales, or wheezes   Abdomen: Soft, NT/ND, no rebound or guarding, NABS   Extremities: no cyanosis or edema appreciated  Neuro: AAO x 3, answers questions appropriately, no FND, gait " unremarkable    Assessment/Plan   Problem List Items Addressed This Visit       Alzheimer disease (CMS/HCC)     With worsening behavioral disturbance and depression, both are being with Seroquel and sertraline with some improvement in symptoms. Has adequate followup with Dr. Curran. Discontinued donepezil, continue memantine.          CAD in native artery     On appropriate medical management, added Imdur given history of possible chest discomfort, no recurrent episodes, seen by cardiology TTE performed.          Sensorineural hearing loss, bilateral     Difficulty with hearing aids placement.          Status post lumbar spinal fusion     With residual discomfort and intermittent scar discomfort, interested in PT referral for further management.          Other Visit Diagnoses       Routine general medical examination at health care facility    -  Primary    Scar pain        Relevant Orders    Referral to Physical Therapy    Left-sided low back pain without sciatica, unspecified chronicity        Relevant Orders    Referral to Physical Therapy    Follow Up In [facility] - PCP - Established    Hypocalcemia      Noted on last labs, will repeat.     Encounter for preventative adult health care examination        Relevant Orders    Lipid Panel (Completed)    Comprehensive Metabolic Panel (Completed)        Health Maintenance  Age appropriate screening performed   Cancer screening: no longer indicated   Vaccines: UTD except for consideration for RSV vaccine and Tetanus shot   Followup 6 months

## 2023-11-13 NOTE — ASSESSMENT & PLAN NOTE
With worsening behavioral disturbance and depression, both are being with Seroquel and sertraline with some improvement in symptoms. Has adequate followup with Dr. Curran. Discontinued donepezil, continue memantine.

## 2023-11-13 NOTE — ASSESSMENT & PLAN NOTE
On appropriate medical management, added Imdur given history of possible chest discomfort, no recurrent episodes, seen by cardiology TTE performed.

## 2023-11-13 NOTE — ASSESSMENT & PLAN NOTE
With residual discomfort and intermittent scar discomfort, interested in PT referral for further management.

## 2023-12-05 ENCOUNTER — OFFICE VISIT (OUTPATIENT)
Dept: CARDIOLOGY | Facility: HOSPITAL | Age: 78
End: 2023-12-05
Payer: MEDICARE

## 2023-12-05 VITALS
HEIGHT: 64 IN | HEART RATE: 63 BPM | BODY MASS INDEX: 23.56 KG/M2 | WEIGHT: 138 LBS | SYSTOLIC BLOOD PRESSURE: 105 MMHG | DIASTOLIC BLOOD PRESSURE: 70 MMHG | OXYGEN SATURATION: 98 %

## 2023-12-05 DIAGNOSIS — I20.1 PRINZMETAL'S ANGINA (CMS-HCC): ICD-10-CM

## 2023-12-05 DIAGNOSIS — E78.00 PURE HYPERCHOLESTEROLEMIA: ICD-10-CM

## 2023-12-05 DIAGNOSIS — I25.10 CAD IN NATIVE ARTERY: Primary | ICD-10-CM

## 2023-12-05 PROCEDURE — 1036F TOBACCO NON-USER: CPT | Performed by: INTERNAL MEDICINE

## 2023-12-05 PROCEDURE — 1159F MED LIST DOCD IN RCRD: CPT | Performed by: INTERNAL MEDICINE

## 2023-12-05 PROCEDURE — 99214 OFFICE O/P EST MOD 30 MIN: CPT | Performed by: INTERNAL MEDICINE

## 2023-12-05 PROCEDURE — 1160F RVW MEDS BY RX/DR IN RCRD: CPT | Performed by: INTERNAL MEDICINE

## 2023-12-05 PROCEDURE — 1126F AMNT PAIN NOTED NONE PRSNT: CPT | Performed by: INTERNAL MEDICINE

## 2023-12-05 ASSESSMENT — ENCOUNTER SYMPTOMS
LOSS OF SENSATION IN FEET: 0
OCCASIONAL FEELINGS OF UNSTEADINESS: 0
DEPRESSION: 0

## 2023-12-05 NOTE — PROGRESS NOTES
Primary Care Physician: Anahi Trivedi DO  Date of Visit: 12/05/2023  2:00 PM EST  Location of visit: [facility]     Chief Complaint:   Chief Complaint   Patient presents with    Coronary Artery Disease        HPI / Summary:   Darius Navarrete is a 78 y.o. male presents for followup.  He is performing tino chi on a regular basis without symptoms.  History is limited by the patient's dementia.  The patient denies chest pain, shortness of breath, palpitations, lightheadedness, syncope, orthopnea, paroxysmal nocturnal dyspnea, lower extremity edema, or bleeding problems.          Past Medical History:   Diagnosis Date    Cutaneous abscess of abdominal wall 05/14/2019    Abdominal wall abscess    Cutaneous abscess, unspecified 02/19/2019    Abscess    Encounter for immunization 11/20/2015    Need for immunization against influenza    Encounter for screening for malignant neoplasm of rectum     Encounter for screening for malignant neoplasm of rectum    Hiccough 05/20/2014    Hiccups    Other conditions influencing health status 05/02/2014    Epicondylitis    Other conditions influencing health status     Coronary Artery Disease    Personal history of other benign neoplasm 09/24/2019    History of other benign neoplasm    Personal history of other diseases of urinary system 04/29/2013    History of hematuria    Personal history of other endocrine, nutritional and metabolic disease     History of hyperlipidemia    Personal history of other specified conditions 05/10/2018    History of insomnia    Personal history of other specified conditions 03/19/2013    History of headache    Personal history of other specified conditions 03/19/2013    History of postnasal drip    Personal history of other specified conditions     History of chest pain    Trigger finger, left middle finger 08/27/2015    Trigger middle finger of left hand    Trigger finger, unspecified middle finger 08/20/2015    Trigger middle finger         Past Surgical History:   Procedure Laterality Date    COLONOSCOPY W/ POLYPECTOMY  12/20/2021    Complete Colonoscopy For Polyp Removal    CT ABDOMEN PELVIS ANGIOGRAM W AND/OR WO IV CONTRAST  8/22/2023    CT ABDOMEN PELVIS ANGIOGRAM W AND/OR WO IV CONTRAST 8/22/2023 [facility] CT    CT GUIDED PERCUTANEOUS PERITONEAL OR RETROPERITONEAL FLUID COLLECTION DRAINAGE  2/5/2019    CT GUIDED PERCUTANEOUS PERITONEAL OR RETROPERITONEAL FLUID COLLECTION DRAINAGE 2/5/2019 [facility] CLINICAL LEGACY    MR CHEST ANGIO W IV CONTRAST  4/27/2015    MR CHEST ANGIO W IV CONTRAST 4/27/2015 [facility] ANCILLARY LEGACY    OTHER SURGICAL HISTORY  03/19/2013    Sigmoidoscopy (Fiberoptic, Therapeutic )    OTHER SURGICAL HISTORY  12/07/2020    Hand surgery    OTHER SURGICAL HISTORY  01/08/2014    Previous Stent Placement    OTHER SURGICAL HISTORY  09/24/2019    Ileostomy    OTHER SURGICAL HISTORY  04/29/2013    Eye Surgery Results Clarity Lens    OTHER SURGICAL HISTORY  04/23/2019    Rotator cuff repair    OTHER SURGICAL HISTORY  04/23/2019    Cardiovascular surgery    SHOULDER SURGERY  04/29/2013    Shoulder Surgery    US GUIDED ABSCESS DRAIN  1/16/2019    US GUIDED ABSCESS DRAIN 1/16/2019 [facility] INPATIENT LEGACY    VASECTOMY  10/10/2017    Surgery Vas Deferens Vasectomy          Social History:   reports that he has never smoked. He has never used smokeless tobacco. He reports current alcohol use of about 7.0 standard drinks of alcohol per week. He reports that he does not currently use drugs.     Family History:  family history is not on file.      Allergies:  No Known Allergies    Outpatient Medications:  Current Outpatient Medications   Medication Instructions    aspirin 81 mg capsule 1 tablet, oral, Daily    atorvastatin (Lipitor) 40 mg tablet 1 tablet, oral, Daily    cholecalciferol (Vitamin D-3) 50 MCG (2000 UT) tablet 1 tablet, oral, Daily    isosorbide mononitrate ER (IMDUR) 30 mg, oral, Daily before breakfast    memantine (Namenda) 10 mg tablet 1  "tablet, oral, Daily    multivitamin (Multiple Vitamins) tablet 1 tablet, oral, Daily    nitroglycerin (Nitrostat) 0.4 mg SL tablet sublingual    NON FORMULARY Magnesium 250mg 1 tablet daily    QUEtiapine (SEROquel) 25 mg tablet TAKE 1 TABLET BY MOUTH AT BEDTIME.    sertraline (Zoloft) 50 mg tablet 1/2 tab PO daily x 2 weeks, then increase to 1 tab PO daily       Physical Exam:  Vitals:    12/05/23 1430   BP: 105/70   BP Location: Left arm   Patient Position: Sitting   Pulse: 63   SpO2: 98%   Weight: 62.6 kg (138 lb)   Height: 1.626 m (5' 4\")     Wt Readings from Last 5 Encounters:   12/05/23 62.6 kg (138 lb)   11/09/23 63.3 kg (139 lb 9.6 oz)   09/06/23 63.6 kg (140 lb 3.2 oz)   08/01/23 64 kg (141 lb 2 oz)   07/19/23 64.4 kg (142 lb)     Body mass index is 23.69 kg/m².   General: Well-developed well-nourished in no acute distress  HEENT: Normocephalic atraumatic  Neck: Supple, JVP is normal negative hepatojugular reflux 2+ carotid pulses without bruit  Pulmonary: Normal respiratory effort, clear to auscultation  Cardiovascular: No right ventricular heave, normal S1 and S2, no murmurs rubs or gallops  Abdomen: Soft nontender nondistended  Extremities: Warm without edema 2+ radial pulses bilaterally 2+ posterior tibial pulses bilaterally  Neurologic: Alert   Psychiatric: Normal mood and affect     Last Labs:  CMP:  Recent Labs     11/09/23  1532 08/25/23  0616 08/24/23  0625    139 140   K 4.6 3.5 3.8    105 103   CO2 30 27 29   ANIONGAP 12 11 12   BUN 30* 21 24*   CREATININE 1.18 1.03 1.09   EGFR 64  --   --    GLUCOSE 104* 114* 96     Recent Labs     11/09/23  1532 08/22/23  0842 12/06/22  1006 10/06/22  1447   ALBUMIN 4.2 4.2 4,000 3.9   ALKPHOS 64 56  --  61   ALT 14 13  --  11   AST 14 18  --  15   BILITOT 0.5 0.9  --  0.5     CBC:  Recent Labs     08/25/23  0616 08/24/23  0625 08/23/23  0633   WBC 6.3 6.5 7.4   HGB 13.1* 13.1* 13.4*   HCT 40.0* 41.4 44.1   * 130* 123*   MCV 88 89 93     COAG: "   Recent Labs     08/22/23  0851 11/15/22  1340   INR 1.2* 1.1     HEME/ENDO:  Recent Labs     10/04/22  1042 10/29/21  1434 12/07/20  1329 04/13/19  1024 01/22/19  1545   FERRITIN  --   --   --  173 391*   IRONSAT  --   --   --   --  10*   TSH 0.90 0.97 1.36  --   --       CARDIAC:   Recent Labs     08/22/23  0842   TROPHS 4     Recent Labs     11/09/23  1532 10/04/22  1042 10/29/21  1434 09/14/20  0843   CHOL 139 108 147 146   LDLF  --  39 46 58   LDLCALC 52  --   --   --    HDL 73.9 57.1 88.2 75.2   TRIG 68 59 66 64       Last Cardiology Tests:  ECG:  An electrocardiogram performed August 31, 2023 that I reviewed shows normal sinus rhythm possible prior anterior infarct pattern.    Echo:  September 25, 2023  CONCLUSIONS:  1. Left ventricular systolic function is normal with a 60% estimated ejection fraction.  2. RVSP within normal limits.  3. Mild aortic valve regurgitation.       Cath:  July 14, 2016  Coronary Lesion Summary:  Vessel            Stenosis      Vessel Segment  LAD          10 to 30% stenosis proximal to mid  2nd Diagonal    30% stenosis       proximal  RPDA            40% stenosis       proximal  CONCLUSIONS:   1. Nonobstructive CAD in a right dominant system.   2. Patent stent in distal RCA.   3. Significant coronary vasospasm involving D2 and RPDA.    Cardiac catheterization February 2011      * Severe single vessel disease of RCA.      * Mild disease of LAD and LCX.      * Normal LVEDP.      * Successful PCI of distal 99% RCA with Xience SOY 2.5 x 18mm      postdilated to 2.5mm at 16 peewee with final kissing balloon inflation in      PLB/PDA bifurcation.    Stress Test:           Cardiac Imaging:        Assessment/Plan   Diagnoses and all orders for this visit:  CAD in native artery  Prinzmetal's angina (CMS/HCC)  Pure hypercholesterolemia    In summary, Mr. Navarrete is a pleasant 78 year-old white male with a past medical history significant for coronary artery disease, status post percutaneous  coronary intervention and drug-eluting stent placement to his distal right coronary artery with preserved left ventricular systolic function, vasospastic angina, and hyperlipidemia.  He is asymptomatic from a cardiac perspective.  His blood pressure and lipids are at goal.  He should continue his current cardiac medications.  We will see him back in follow-up in 1 year.      Orders:  No orders of the defined types were placed in this encounter.     Followup Appts:  Future Appointments   Date Time Provider Department Center   12/18/2023  3:00 PM Shima Merida PT [email or web address] [facility]   12/28/2023 12:15 PM Patrick Cueto PTA [email or web address] [facility]   1/4/2024 12:15 PM Patrick Cueto PTA [email or web address] [facility]   1/17/2024  2:30 PM Stefano Curran MD [id number] [facility]   5/9/2024  2:00 PM Anahi Trivedi DO [id number] [facility]           ____________________________________________________________  Ok Norris MD  [facility]  [facility]

## 2023-12-08 ENCOUNTER — DOCUMENTATION (OUTPATIENT)
Dept: PRIMARY CARE | Facility: CLINIC | Age: 78
End: 2023-12-08
Payer: MEDICARE

## 2023-12-08 DIAGNOSIS — F32.A DEPRESSION, UNSPECIFIED DEPRESSION TYPE: ICD-10-CM

## 2023-12-08 DIAGNOSIS — G30.9 ALZHEIMER DISEASE (MULTI): ICD-10-CM

## 2023-12-08 DIAGNOSIS — F02.80 ALZHEIMER DISEASE (MULTI): ICD-10-CM

## 2023-12-08 PROCEDURE — 99490 CHRNC CARE MGMT STAFF 1ST 20: CPT | Performed by: INTERNAL MEDICINE

## 2023-12-08 NOTE — PROGRESS NOTES
CCM outreach with pt identified by name and , spoke with pts wife Kelley.    LOV: 23  NOV: 24    Health Maintenance Due: up to date    Goals for Alheimers:   Promoting the patient's safety   [place] in self-care activities   Reducing anxiety and agitation   Improving socialization and intimacy adequate nutrition    Supporting and educating the family caregivers.    Goals for Depression:  Monitor mood and behavior changes.  Administer prescribed antidepressant medication.  Facilitate regular psychotherapy sessions.  Provide education on depression management.  Stick to a daily schedule  Call support group or person   Keep a healthy weight and eat healthy diet options    Spoke with pts wife Kelley who states pt is about the same.  Pt is going to start some PT and dry needling next week for pain and scar tissue build up.  Pt continues to take his meds and continues to go to [facility] for day camp 2x week.      Medications reviewed no refills requested.  Pts wife understands and is in agreement with POC.  Encouraged Kelley to reach out with any healthcare needs.

## 2023-12-18 ENCOUNTER — EVALUATION (OUTPATIENT)
Dept: PHYSICAL THERAPY | Facility: CLINIC | Age: 78
End: 2023-12-18
Payer: MEDICARE

## 2023-12-18 DIAGNOSIS — M54.9 BACK PAIN: Primary | ICD-10-CM

## 2023-12-18 DIAGNOSIS — L90.5 SCAR PAIN: ICD-10-CM

## 2023-12-18 PROCEDURE — 97110 THERAPEUTIC EXERCISES: CPT | Mod: GP

## 2023-12-18 PROCEDURE — 97161 PT EVAL LOW COMPLEX 20 MIN: CPT | Mod: GP

## 2023-12-18 ASSESSMENT — ENCOUNTER SYMPTOMS
OCCASIONAL FEELINGS OF UNSTEADINESS: 0
DEPRESSION: 1
LOSS OF SENSATION IN FEET: 0

## 2023-12-18 NOTE — PROGRESS NOTES
Physical Therapy  Physical Therapy Orthopedic Evaluation    Patient Name: Darius Navarrete  MRN: [id number]  Today's Date: 2023  Time Calculation  Start Time: 1500  Stop Time: 1541  Time Calculation (min): 41 min    Insurance:  Visit number: 1 of [place]  Authorization info: No auth  Insurance Type: Medicare and AARP Plan F  Medicare Certification Period: Beginnin23 Ending:  3/17/2024  Onset date: 23    General:  Reason for visit: Lumbar Pain  Referred by: Anahi Trivedi DO    Current Problem:  1. Back pain  Referral to Physical Therapy      2. Scar pain  Referral to Physical Therapy          Precautions: Alzheimer's Disease, Hearing Loss  Precautions  STEADI Fall Risk Score (The score of 4 or more indicates an increased risk of falling): 4      Medical History Form: Reviewed (scanned into chart)    Subjective:   Subjective   Chief Complaint: Patient presents to clinic with persistent lumbar and R sided flank pain that radiates down the R thigh. Hx of Lumbar fusion L5-S1 in 2019 complicated by bowel rupture and multiple subsequent colorectal surgeries. Presenting to PT for eval/treatment, including dry needling, which has provided some relief in the back pain in the past.   Onset Date: 23 (MD Hinkle)  SAM: Chronic    Current Condition:   Worse    Pain:  Pain level current:  Unable to rate due to Alzheimer's disease  Pain level at worst: Unable to rate due to Alzheimer's disease  Pain level at best: Unable to rate due to Alzheimer's disease  Location: Low back, right anterior thigh  Description: sharp, stiff  Aggravating Factors: Prolonged sitting, walking, sit-stand  Relieving Factors:  Tylenol, heating pad    Relevant Information (PMH & Previous Tests/Imaging):   Alzheimer's Disease  Previous lumbar x-rays and MRI: There is multilevel spondylosis with varying degrees of spinal canal and neural foraminal narrowing    Previous Interventions/Treatments: Physical Therapy, Dry Needling, and  Injections    Prior Level of Function (PLOF)  Independent with dressing, showering. Currently at 50% of PLOF.  Functional limitations: Walking > 10 minutes, sitting> 20-30 minutes, lifting heavy objects, labored transfers in/out of bed   Exercise/Physical Activity: [employer], Walking in the home  Work/School: Retired.  [facility] [employer] [facility]    Patients Living Environment: Lives with wife    Primary Language: English    Patient's Goal(s) for Therapy: Reduce pain    Red Flags: Do you have any of the following? No  Fever/chills, unexplained weight changes, dizziness/fainting, unexplained change in bowel or bladder functions, unexplained malaise or muscle weakness, night pain/sweats, numbness or tingling    Objective:  Objective     Posture: Thoracic Kyphosis, flattened lordosis    Gait: Without assistive device, turns out mildly bilaterally    Observation: Incisional scar along posterolateral R flank    Palpation: TrPs in lumbar paraspinals and R QL      ROM    Lumbar AROM (%)    Flexion: 75%  Extension: 25%  (R) Side Bend: 50% pain  (L) Side Bend: 50%  (R) Rotation: 90%  (L) Rotation: 90%      Hip PROM (Degrees)       (R)  (L)  Flexion:  WFL  WFL      Extension:  WFL  WFL    Abduction:  25  40     ER:   50  50    IR:   8  8           Strength Testing    Hip     (R)  (L)  Flexion:  4  4     Extension:  Good lift with bridge        Knee     (R)  (L)  Flexion:  5  5     Extension:  5  5    Ankle     (R)  (L)  Dorsiflexion:  5  5  Plantarflexion:   4+  4+     Core Control:  Pelvic Bridge: Good lift with bridge       Flexibility       (R)  (L)  Hamstrings:  Mod morley Mod morley  Piriformis:  Major morley Major morley  Quadriceps:  NT  NT       Functional Movement  Sit to stand: Mild UE reliance  Bed Mobility: Independent, slow    Neuro:    Dermatomes: Intact to light touch  Myotomes: WNL BLE's    Special Tests    Hip Scouring: equivocal bilaterally  MARLENA Test: (-) bilaterally      Outcome Measures:  Other  Measures  Oswestry Disablity Index (JOSÉ): 38%       EDUCATION: Home exercise program, plan of care, activity modifications, posture, pain management, and injury pathology       Goals: Set and discussed today  Active       PT Problem       PT Goal 1       Start:  12/18/23    Expected End:  02/12/24       Lumbar ROM increased to WFL by week 8.  Hip internal rotation ROM increased to at least 20 degrees bilaterally by week 8.  Stand 15 minutes with minimal difficulty by week 8.  Walk 15 minutes with minimal difficulty by week 8.  Oswestry score improved by 10% by week 8.  Modified independent (assist from wife) with home exercise program for symptom reduction and functional gains by week 8.                Plan of care was developed with input and agreement by the patient      Treatment Performed:    Instructed in initial home exercise program (Handout provided).    Personalized Home Exercise Program:  Access Code: [email or web address]  URL: [email or web address]  Date: 12/18/2023  Prepared by: Shima Merida    Exercises  - Hooklying Single Knee to Chest Stretch  - 1-2 x daily - 7 x weekly - 3 reps - 30 seconds hold  - Supine Piriformis Stretch  - 1-2 x daily - 7 x weekly - 3 reps - 30 seconds hold  - Supine Hamstring Stretch with Strap  - 1-2 x daily - 7 x weekly - 3 reps - 30 second hold  - Supine Bridge  - 1 x daily - 7 x weekly - 3 sets - 10 reps - 5 seconds hold    Assessment: Patient is a 78 year old male with PMHx notable for Alzheimer's Disease and L5-S1 Fusion in January 2019 with presenting to PT with persistent back pain.  Upon examination, he demonstrates limited lumbar ROM, LE flexibility deficits, decreased core and LE strength.   Functional limitations include difficulty with prolonged walking and standing, bed mobility and sit-stand.  Pt would benefit from physical therapy to address the impairments found  in order to reduce pain-free with ADLs and improve function.         Plan:      Planned Interventions include: therapeutic exercise, self-care home management, manual therapy, dry needling, therapeutic activities, gait training, neuromuscular coordination.  Frequency: 1-2 x Week  Duration: 8 Weeks      Shima Merida PT

## 2023-12-20 NOTE — PROGRESS NOTES
Physical Therapy  Physical Therapy Treatment    Patient Name: Darius Navarrete  MRN: [id number]  Today's Date: 2023  Time Calculation  Start Time: 1445  Stop Time: 1500  Time Calculation (min): 15 min    Insurance:  Visit number: 2 of [place]  Authorization info: No auth  Insurance Type: Medicare and AARP Plan F  Medicare Certification Period: Beginnin23 Ending:  3/17/2024  Onset date: 23     General:  Reason for visit: Lumbar Pain  Referred by: Anahi Trivedi DO       Current Problem  1. Scar pain        2. Back pain            Precautions  Precautions Comment: Alzheimers    Pain Assessment: 0-10  Pain Score: 2    Subjective:   Patient and wife reports low back pain that occasionally radiates into anterior thigh. Had success with dry needling before so would like to try again.     HEP Performed:  Yes    Objective:   Palpation: hypertonic lumbar paraspinals    Treatments:   Dry needlin mm to lumbar paraspinals     Charges: Man x1     Assessment: Pt with TPR in right lumbar paraspinals today. Advised to monitor symptoms over next 24 hours.       Plan: Continue dry needling 4-6 sessions until they leave for [place] in February.    Linda Cavanaugh, PT

## 2023-12-21 ENCOUNTER — TREATMENT (OUTPATIENT)
Dept: PHYSICAL THERAPY | Facility: CLINIC | Age: 78
End: 2023-12-21
Payer: MEDICARE

## 2023-12-21 DIAGNOSIS — M54.9 BACK PAIN: ICD-10-CM

## 2023-12-21 DIAGNOSIS — L90.5 SCAR PAIN: Primary | ICD-10-CM

## 2023-12-21 PROCEDURE — 97140 MANUAL THERAPY 1/> REGIONS: CPT | Mod: GP

## 2023-12-21 ASSESSMENT — PAIN SCALES - GENERAL: PAINLEVEL_OUTOF10: 2

## 2023-12-21 ASSESSMENT — PAIN - FUNCTIONAL ASSESSMENT: PAIN_FUNCTIONAL_ASSESSMENT: 0-10

## 2023-12-26 ENCOUNTER — TREATMENT (OUTPATIENT)
Dept: PHYSICAL THERAPY | Facility: CLINIC | Age: 78
End: 2023-12-26
Payer: MEDICARE

## 2023-12-26 DIAGNOSIS — L90.5 SCAR PAIN: Primary | ICD-10-CM

## 2023-12-26 DIAGNOSIS — M54.9 BACK PAIN: ICD-10-CM

## 2023-12-26 PROCEDURE — 97140 MANUAL THERAPY 1/> REGIONS: CPT | Mod: GP

## 2023-12-26 ASSESSMENT — PAIN SCALES - GENERAL: PAINLEVEL_OUTOF10: 0 - NO PAIN

## 2023-12-26 ASSESSMENT — PAIN - FUNCTIONAL ASSESSMENT: PAIN_FUNCTIONAL_ASSESSMENT: 0-10

## 2023-12-26 NOTE — PROGRESS NOTES
Physical Therapy  Physical Therapy Treatment    Patient Name: Darius Navarrete  MRN: [id number]  Today's Date: 2023  Time Calculation  Start Time: 1745  Stop Time: 1800  Time Calculation (min): 15 min    Insurance:  Visit number: 3 of [place]  Authorization info: No auth  Insurance Type: Medicare and AARP Plan F  Medicare Certification Period: Beginnin23 Ending:  3/17/2024  Onset date: 23     General:  Reason for visit: Lumbar Pain  Referred by: Anahi Trivedi DO       Current Problem  1. Scar pain        2. Back pain            Precautions  Precautions Comment: Alzheimers    Pain Assessment: 0-10  Pain Score: 0 - No pain    Subjective:   Patient and wife report 2 days without complaints of back pain after last session. Reports no pain today.     HEP Performed:  Yes    Objective:   Palpation: hypertonic lumbar paraspinals    Treatments:   Dry needlin mm to lumbar paraspinals     Charges: Man x1     Assessment: Pt with TPR in B lumbar paraspinals today.      Plan: Continue dry needling 4-6 sessions total until they leave for [place] in February.    Linda Cavanaugh, PT

## 2023-12-28 ENCOUNTER — TREATMENT (OUTPATIENT)
Dept: PHYSICAL THERAPY | Facility: CLINIC | Age: 78
End: 2023-12-28
Payer: MEDICARE

## 2023-12-28 DIAGNOSIS — M54.9 BACK PAIN: Primary | ICD-10-CM

## 2023-12-28 DIAGNOSIS — L90.5 SCAR PAIN: ICD-10-CM

## 2023-12-28 PROCEDURE — 97112 NEUROMUSCULAR REEDUCATION: CPT | Mod: GP,CQ

## 2023-12-28 PROCEDURE — 97110 THERAPEUTIC EXERCISES: CPT | Mod: GP,CQ

## 2023-12-28 ASSESSMENT — PAIN SCALES - GENERAL: PAINLEVEL_OUTOF10: 0 - NO PAIN

## 2023-12-28 ASSESSMENT — PAIN - FUNCTIONAL ASSESSMENT: PAIN_FUNCTIONAL_ASSESSMENT: 0-10

## 2023-12-28 NOTE — PROGRESS NOTES
Physical Therapy  Physical Therapy Treatment    Patient Name: Darius Navarrete  MRN: [id number]  Today's Date: 2023  Time Calculation  Start Time: 1220  Stop Time: 1305  Time Calculation (min): 45 min    Insurance:  Visit number: 4 of [place]  Authorization info: No auth  Insurance Type: Medicare and AARP Plan F  Medicare Certification Period: Beginnin23 Ending:  3/17/2024  Onset date: 23     General:  Reason for visit: Lumbar Pain  Referred by: Anahi Trivedi DO       Current Problem  1. Back pain        2. Scar pain              Precautions  Precautions Comment: Alzheimers    Pain Assessment: 0-10  Pain Score: 0 - No pain    Subjective:   Patient reports having no back pain today.    HEP Performed:  Yes    Objective:   Lumbar ROM- (not checked today)  TTP Rt. Rib area    Treatments:   Bike (seat#3)- 3'  DBE 2x1.5'  Bosu lunge alternating- 1.5'  KB 4kg tandem stand R/L, L/R cw/ccw- 1' ea dir.  Hamstring curl 20# 2x15  Hip ab/ad 25#/32.5# 2x15 ea.  Cybex 3 step walkout 2.5# R/L x 10 ea.  SS R/L Quad/HF/Pirif- 1.5' ea.    Charges: TE 2, NME 1 (CQ Modifier)    Assessment:   Patient was very cognitive and followed direction well.  [place] looser after the session.    Plan:   Continue decreasing scar sensitivity utilizing dry needling for 4-6 visits and increasing lumbar rom, flexibility, mobility, balance, strength and function with land based exercise.    Patrick Cueto, PTA

## 2024-01-04 ENCOUNTER — TREATMENT (OUTPATIENT)
Dept: PHYSICAL THERAPY | Facility: CLINIC | Age: 79
End: 2024-01-04
Payer: MEDICARE

## 2024-01-04 DIAGNOSIS — M54.9 BACK PAIN: Primary | ICD-10-CM

## 2024-01-04 DIAGNOSIS — L90.5 SCAR PAIN: ICD-10-CM

## 2024-01-04 PROCEDURE — 97112 NEUROMUSCULAR REEDUCATION: CPT | Mod: GP,CQ

## 2024-01-04 PROCEDURE — 97110 THERAPEUTIC EXERCISES: CPT | Mod: GP,CQ

## 2024-01-04 ASSESSMENT — PAIN SCALES - GENERAL: PAINLEVEL_OUTOF10: 0 - NO PAIN

## 2024-01-04 ASSESSMENT — PAIN - FUNCTIONAL ASSESSMENT: PAIN_FUNCTIONAL_ASSESSMENT: 0-10

## 2024-01-04 NOTE — PROGRESS NOTES
Physical Therapy  Physical Therapy Treatment    Patient Name: Darius Navarrete  MRN: [id number]  Today's Date: 2024  Time Calculation  Start Time: 1220  Stop Time: 1305  Time Calculation (min): 45 min    Insurance:  Visit number: 1 of [place]   (4 visits in )  Authorization info: No auth  Insurance Type: Medicare and AARP Plan F  Medicare Certification Period: Beginnin23 Ending:  3/17/2024  Onset date: 23     General:  Reason for visit: Lumbar Pain  Referred by: Anahi Trivedi DO       Current Problem  1. Back pain        2. Scar pain              Precautions  Precautions Comment: Alzheimers    Pain Assessment: 0-10  Pain Score: 0 - No pain    Subjective:   Patient reports having no back pain again today.    HEP Performed:  Yes    Objective:   Lumbar ROM- L. Flex/Ext       Treatments:   Bike (seat#3)- 3'  DBE 2x1.5'  Bosu lunge alternating- 1.5'  KB 4kg tandem stand R/L, L/R cw/ccw- 1' ea dir.  Hamstring curl 25# 1x20  Hip ab/ad 25#/25# 2x20 ea.  Multihip ext 22# R/L x 15 ea.  Cybex 3 step walkout 2.5# R/L x 10 ea.  SS R/L Quad/HF/Pirif- 1.5' ea.    Charges: TE 2, NME 1 (CQ Modifier)    Assessment:   Patient continues to be very good about following commands and counting during performing his exercises.    Plan:   Continue decreasing scar sensitivity utilizing dry needling for 4-6 visits and increasing lumbar rom, flexibility, mobility, balance, strength and function with land based exercise.    Patrick Cueto, PTA

## 2024-01-05 ENCOUNTER — DOCUMENTATION (OUTPATIENT)
Dept: PRIMARY CARE | Facility: CLINIC | Age: 79
End: 2024-01-05
Payer: MEDICARE

## 2024-01-05 DIAGNOSIS — G30.9 ALZHEIMER DISEASE (MULTI): ICD-10-CM

## 2024-01-05 DIAGNOSIS — F32.A DEPRESSION, UNSPECIFIED DEPRESSION TYPE: ICD-10-CM

## 2024-01-05 DIAGNOSIS — F02.80 ALZHEIMER DISEASE (MULTI): ICD-10-CM

## 2024-01-05 PROBLEM — Z98.890 HISTORY OF SURGICAL PROCEDURE: Status: ACTIVE | Noted: 2024-01-05

## 2024-01-05 PROBLEM — L57.0 ACTINIC KERATOSIS: Status: ACTIVE | Noted: 2020-10-15

## 2024-01-05 PROBLEM — M79.604 PAIN IN BOTH LOWER EXTREMITIES: Status: ACTIVE | Noted: 2024-01-05

## 2024-01-05 PROBLEM — M76.31 ILIOTIBIAL BAND SYNDROME OF BOTH SIDES: Status: ACTIVE | Noted: 2024-01-05

## 2024-01-05 PROBLEM — F02.811: Status: ACTIVE | Noted: 2023-08-25

## 2024-01-05 PROBLEM — K29.00 ACUTE GASTRITIS: Status: ACTIVE | Noted: 2023-08-22

## 2024-01-05 PROBLEM — R14.0 FLATULENCE/GAS PAIN/BELCHING: Status: ACTIVE | Noted: 2024-01-05

## 2024-01-05 PROBLEM — K63.5 POLYP OF SIGMOID COLON: Status: ACTIVE | Noted: 2024-01-05

## 2024-01-05 PROBLEM — H90.3 SENSORINEURAL HEARING LOSS (SNHL) OF BOTH EARS: Status: ACTIVE | Noted: 2023-07-31

## 2024-01-05 PROBLEM — M43.16 SPONDYLOLISTHESIS, LUMBAR REGION: Status: ACTIVE | Noted: 2024-01-05

## 2024-01-05 PROBLEM — R42 LIGHTHEADEDNESS: Status: ACTIVE | Noted: 2024-01-05

## 2024-01-05 PROBLEM — Z20.822 CONTACT WITH AND (SUSPECTED) EXPOSURE TO COVID-19: Status: ACTIVE | Noted: 2023-08-25

## 2024-01-05 PROBLEM — Z86.39 HISTORY OF ELEVATED LIPIDS: Status: ACTIVE | Noted: 2024-01-05

## 2024-01-05 PROBLEM — G56.01 CARPAL TUNNEL SYNDROME OF RIGHT WRIST: Status: ACTIVE | Noted: 2024-01-05

## 2024-01-05 PROBLEM — B07.9 VIRAL WART, UNSPECIFIED: Status: ACTIVE | Noted: 2020-10-15

## 2024-01-05 PROBLEM — R14.0 ABDOMINAL BLOATING: Status: ACTIVE | Noted: 2024-01-05

## 2024-01-05 PROBLEM — K21.00 GASTRO-ESOPHAGEAL REFLUX DISEASE WITH ESOPHAGITIS, WITHOUT BLEEDING: Status: ACTIVE | Noted: 2023-08-25

## 2024-01-05 PROBLEM — M94.20 CHONDROMALACIA: Status: ACTIVE | Noted: 2024-01-05

## 2024-01-05 PROBLEM — M79.676 PAIN OF GREAT TOE: Status: ACTIVE | Noted: 2024-01-05

## 2024-01-05 PROBLEM — Z98.1 HISTORY OF LUMBAR FUSION: Status: ACTIVE | Noted: 2023-07-31

## 2024-01-05 PROBLEM — K63.2 ENTEROCUTANEOUS FISTULA: Status: ACTIVE | Noted: 2024-01-05

## 2024-01-05 PROBLEM — G89.29 CHRONIC HAND PAIN, RIGHT: Status: ACTIVE | Noted: 2024-01-05

## 2024-01-05 PROBLEM — M25.562 LEFT KNEE PAIN: Status: ACTIVE | Noted: 2024-01-05

## 2024-01-05 PROBLEM — M17.10 PATELLOFEMORAL ARTHRITIS: Status: ACTIVE | Noted: 2024-01-05

## 2024-01-05 PROBLEM — K56.609 SMALL BOWEL OBSTRUCTION (MULTI): Status: ACTIVE | Noted: 2023-08-25

## 2024-01-05 PROBLEM — K64.9 HEMORRHOIDS: Status: ACTIVE | Noted: 2024-01-05

## 2024-01-05 PROBLEM — M79.605 PAIN IN BOTH LOWER EXTREMITIES: Status: ACTIVE | Noted: 2024-01-05

## 2024-01-05 PROBLEM — M25.531 PAIN IN BOTH WRISTS: Status: ACTIVE | Noted: 2024-01-05

## 2024-01-05 PROBLEM — M25.532 PAIN IN BOTH WRISTS: Status: ACTIVE | Noted: 2024-01-05

## 2024-01-05 PROBLEM — T61.781A: Status: ACTIVE | Noted: 2024-01-05

## 2024-01-05 PROBLEM — M76.32 ILIOTIBIAL BAND SYNDROME OF BOTH SIDES: Status: ACTIVE | Noted: 2024-01-05

## 2024-01-05 PROBLEM — M79.641 CHRONIC HAND PAIN, RIGHT: Status: ACTIVE | Noted: 2024-01-05

## 2024-01-05 PROBLEM — T61.91XA: Status: ACTIVE | Noted: 2024-01-05

## 2024-01-05 PROBLEM — M54.50 LOWER BACK PAIN: Status: ACTIVE | Noted: 2024-01-05

## 2024-01-05 PROBLEM — D64.9 ANEMIA: Status: ACTIVE | Noted: 2024-01-05

## 2024-01-05 PROBLEM — M48.061 DEGENERATIVE LUMBAR SPINAL STENOSIS: Status: ACTIVE | Noted: 2024-01-05

## 2024-01-05 PROBLEM — F03.90 DEMENTIA (MULTI): Status: ACTIVE | Noted: 2024-01-05

## 2024-01-05 PROBLEM — L30.9 DERMATITIS, UNSPECIFIED: Status: ACTIVE | Noted: 2020-10-15

## 2024-01-05 PROBLEM — R12 HEARTBURN: Status: ACTIVE | Noted: 2024-01-05

## 2024-01-05 PROBLEM — M79.643 PAIN IN HAND: Status: ACTIVE | Noted: 2024-01-05

## 2024-01-05 PROBLEM — G31.84 MILD COGNITIVE IMPAIRMENT: Status: ACTIVE | Noted: 2024-01-05

## 2024-01-05 PROBLEM — G56.02 CARPAL TUNNEL SYNDROME OF LEFT WRIST: Status: ACTIVE | Noted: 2024-01-05

## 2024-01-05 PROBLEM — E78.5 HYPERLIPIDEMIA: Status: ACTIVE | Noted: 2024-01-05

## 2024-01-05 PROBLEM — M47.816 LUMBAR SPONDYLOSIS: Status: ACTIVE | Noted: 2024-01-05

## 2024-01-05 PROBLEM — R07.89 ATYPICAL CHEST PAIN: Status: ACTIVE | Noted: 2023-08-31

## 2024-01-05 PROBLEM — R10.9 ABDOMINAL PAIN: Status: ACTIVE | Noted: 2024-01-05

## 2024-01-05 PROBLEM — E63.9 UNDERNUTRITION: Status: ACTIVE | Noted: 2024-01-05

## 2024-01-05 PROBLEM — M65.30 ACQUIRED TRIGGER FINGER: Status: ACTIVE | Noted: 2024-01-05

## 2024-01-05 PROBLEM — E78.5 DYSLIPIDEMIA: Status: ACTIVE | Noted: 2024-01-05

## 2024-01-05 PROBLEM — M54.16 LUMBAR RADICULOPATHY: Status: ACTIVE | Noted: 2023-07-31

## 2024-01-05 PROBLEM — M25.532 PAIN IN LEFT WRIST: Status: ACTIVE | Noted: 2022-09-20

## 2024-01-05 PROBLEM — M43.10 SPONDYLOLISTHESIS: Status: ACTIVE | Noted: 2024-01-05

## 2024-01-05 PROBLEM — R14.0 ABDOMINAL DISTENSION: Status: ACTIVE | Noted: 2024-01-05

## 2024-01-05 PROBLEM — L90.5 SCAR OF SKIN: Status: ACTIVE | Noted: 2023-12-18

## 2024-01-05 PROBLEM — I70.309: Status: ACTIVE | Noted: 2023-08-01

## 2024-01-05 PROBLEM — K21.00 ESOPHAGITIS, REFLUX: Status: ACTIVE | Noted: 2023-07-31

## 2024-01-05 PROBLEM — E83.51 HYPOCALCEMIA: Status: ACTIVE | Noted: 2024-01-05

## 2024-01-05 PROBLEM — M17.10 ARTHRITIS OF KNEE: Status: ACTIVE | Noted: 2022-09-20

## 2024-01-05 PROBLEM — K92.2 GASTROINTESTINAL HEMORRHAGE: Status: ACTIVE | Noted: 2024-01-05

## 2024-01-05 PROBLEM — D22.5 MELANOCYTIC NEVI OF TRUNK: Status: ACTIVE | Noted: 2020-10-15

## 2024-01-05 PROCEDURE — 99490 CHRNC CARE MGMT STAFF 1ST 20: CPT | Performed by: INTERNAL MEDICINE

## 2024-01-05 NOTE — PROGRESS NOTES
CCM outreach with pts wife Kelley pt identified by name and .    LOV: 23  NOV: 24    Health Maintenance Due: up to date    Goals for Alheimers:   Promoting the patient's safety   [place] in self-care activities   Reducing anxiety and agitation   Improving socialization and intimacy adequate nutrition    Supporting and educating the family caregivers.    Goals for Depression:  Monitor mood and behavior changes.  Administer prescribed antidepressant medication.  Facilitate regular psychotherapy sessions.  Provide education on depression management.  Stick to a daily schedule  Call support group or person   Keep a healthy weight and eat healthy diet options    Spoke with pts wife regarding pt who states his memory continues to worsen.  Pt has no short term memory  according to pts wife.  Pt continues to go to [facility] 2x [facility].  Pts wife is looking into Memory Care for pt for the future.    Medications reviewed no refills requested.  Pts wife verbalizes understanding and is in agreement with POC.  Encouraged pts wife to reach out with any healthcare needs.

## 2024-01-08 DIAGNOSIS — F32.89 OTHER DEPRESSION: ICD-10-CM

## 2024-01-09 RX ORDER — SERTRALINE HYDROCHLORIDE 50 MG/1
TABLET, FILM COATED ORAL
Qty: 90 TABLET | Refills: 3 | Status: SHIPPED | OUTPATIENT
Start: 2024-01-09

## 2024-01-11 ENCOUNTER — TREATMENT (OUTPATIENT)
Dept: PHYSICAL THERAPY | Facility: CLINIC | Age: 79
End: 2024-01-11
Payer: MEDICARE

## 2024-01-11 DIAGNOSIS — M54.9 BACK PAIN: Primary | ICD-10-CM

## 2024-01-11 DIAGNOSIS — L90.5 SCAR PAIN: ICD-10-CM

## 2024-01-11 PROCEDURE — 97110 THERAPEUTIC EXERCISES: CPT | Mod: GP,CQ

## 2024-01-11 PROCEDURE — 97112 NEUROMUSCULAR REEDUCATION: CPT | Mod: GP,CQ

## 2024-01-11 ASSESSMENT — PAIN SCALES - GENERAL: PAINLEVEL_OUTOF10: 0 - NO PAIN

## 2024-01-11 ASSESSMENT — PAIN - FUNCTIONAL ASSESSMENT: PAIN_FUNCTIONAL_ASSESSMENT: 0-10

## 2024-01-12 NOTE — PROGRESS NOTES
Physical Therapy  Physical Therapy Treatment    Patient Name: Darius Navarrete  MRN: [id number]  Today's Date: 1/15/2024  Time Calculation  Start Time: 1300  Stop Time: 1315  Time Calculation (min): 15 min    Insurance:  Visit number: 3 of [place]   (4 visits in )  Authorization info: No auth  Insurance Type: Medicare and AARP Plan F  Medicare Certification Period: Beginnin23 Ending:  3/17/2024  Onset date: 23     General:  Reason for visit: Lumbar Pain  Referred by: Anahi Trivedi DO       Current Problem  1. Back pain        2. Scar pain              Precautions  Precautions Comment: Alzheimers    Pain Assessment: 0-10  Pain Score: 0 - No pain    Subjective:   Patient and wife report no back pain complaints but still has radiating pain in front of thigh.    HEP Performed:  Yes    Objective:   Palpation: hypertonic lumbar paraspinals    Treatments:   Dry needlin mm to lumbar paraspinals     Charges: Man x1     Assessment: Pt with TPR in B lumbar paraspinals today.      Plan: Continue 1 more dry needling session before they leave for [place] in February.    Linda Cavanaugh, PT

## 2024-01-15 ENCOUNTER — TREATMENT (OUTPATIENT)
Dept: PHYSICAL THERAPY | Facility: CLINIC | Age: 79
End: 2024-01-15
Payer: MEDICARE

## 2024-01-15 DIAGNOSIS — M54.9 BACK PAIN: Primary | ICD-10-CM

## 2024-01-15 DIAGNOSIS — L90.5 SCAR PAIN: ICD-10-CM

## 2024-01-15 PROCEDURE — 97140 MANUAL THERAPY 1/> REGIONS: CPT | Mod: GP

## 2024-01-15 ASSESSMENT — PAIN - FUNCTIONAL ASSESSMENT: PAIN_FUNCTIONAL_ASSESSMENT: 0-10

## 2024-01-15 ASSESSMENT — PAIN SCALES - GENERAL: PAINLEVEL_OUTOF10: 0 - NO PAIN

## 2024-01-17 ENCOUNTER — OFFICE VISIT (OUTPATIENT)
Dept: NEUROLOGY | Facility: CLINIC | Age: 79
End: 2024-01-17
Payer: MEDICARE

## 2024-01-17 VITALS — SYSTOLIC BLOOD PRESSURE: 112 MMHG | OXYGEN SATURATION: 96 % | DIASTOLIC BLOOD PRESSURE: 64 MMHG | HEART RATE: 69 BPM

## 2024-01-17 DIAGNOSIS — F32.89 OTHER DEPRESSION: ICD-10-CM

## 2024-01-17 DIAGNOSIS — G30.9 ALZHEIMER DISEASE (MULTI): Primary | ICD-10-CM

## 2024-01-17 DIAGNOSIS — F02.80 ALZHEIMER DISEASE (MULTI): Primary | ICD-10-CM

## 2024-01-17 PROCEDURE — 99214 OFFICE O/P EST MOD 30 MIN: CPT | Performed by: PSYCHIATRY & NEUROLOGY

## 2024-01-17 PROCEDURE — 1126F AMNT PAIN NOTED NONE PRSNT: CPT | Performed by: PSYCHIATRY & NEUROLOGY

## 2024-01-17 PROCEDURE — 1159F MED LIST DOCD IN RCRD: CPT | Performed by: PSYCHIATRY & NEUROLOGY

## 2024-01-17 PROCEDURE — 1160F RVW MEDS BY RX/DR IN RCRD: CPT | Performed by: PSYCHIATRY & NEUROLOGY

## 2024-01-17 PROCEDURE — 1036F TOBACCO NON-USER: CPT | Performed by: PSYCHIATRY & NEUROLOGY

## 2024-01-17 NOTE — PROGRESS NOTES
Start Time:3:00pm  Stop Time:3:25pm    Chart reviewed, including physician notes and diagnostic studies, for 4 minutes prior to this appointment.      Accompanied by: wife    Following the patient's last visit, they were to decrease memantine due to fogginess.  He also has had issues with depression since I've seen him last.  Because of this, donepezil was discontinued and psychiatric medications were adjusted.  I last saw him in July 2023.  He says his mood is good.  He is quick to anger at times according to his wife.  Sleeping well.  He paces a lot and is always active.  Some visual hallucinations and cognitive delusions.  He sleeps well.  No falls.  No passive death wish or suicidal ideation.  No physical aggression.  He goes to a day program twice weekly.  She plans on taking him to [place] for a month and likely placing him in a memory care unit.    No Known Allergies      Current Outpatient Medications:     aspirin 81 mg capsule, Take 1 tablet by mouth once daily., Disp: , Rfl:     atorvastatin (Lipitor) 40 mg tablet, Take 1 tablet (40 mg) by mouth once daily., Disp: , Rfl:     cholecalciferol (Vitamin D-3) 50 MCG (2000 UT) tablet, Take 1 tablet (2,000 Units) by mouth once daily., Disp: , Rfl:     isosorbide mononitrate ER (Imdur) 30 mg 24 hr tablet, Take 1 tablet (30 mg) by mouth once daily in the morning. Take before meals., Disp: , Rfl:     memantine (Namenda) 10 mg tablet, Take 1 tablet (10 mg) by mouth once daily., Disp: , Rfl:     multivitamin (Multiple Vitamins) tablet, Take 1 tablet by mouth once daily., Disp: , Rfl:     nitroglycerin (Nitrostat) 0.4 mg SL tablet, Place under the tongue., Disp: , Rfl:     NON FORMULARY, Magnesium 250mg 1 tablet daily, Disp: , Rfl:     QUEtiapine (SEROquel) 25 mg tablet, TAKE 1 TABLET BY MOUTH AT BEDTIME., Disp: 90 tablet, Rfl: 3    sertraline (Zoloft) 50 mg tablet, 1 tab PO daily, Disp: 90 tablet, Rfl: 3    Review of Systems  As per HPI, otherwise all other systems have  "been reviewed are negative for complaint.      Objective   /64   Pulse 69   SpO2 96%     EXAMINATION  Mental Status Examination  General appearance: well kept, poor eye contact, uncooperative  Orientation: alert and oriented to person  Motor: unremarkable, gait is stable  Speech: fluent aphasia  Mood: euthymic  Affect: Euthymic, full-range  Passive death wish: No  Suicidal ideation: No  Thought process: disorganized  Thought content: Hallucinations:no, Delusions: none, denied; and not responding to internal stimuli  Insight/Judgment: Poor/Poor  Fund of knowledge: Poor  Recent and remote memory: Poor/Poor  Attention span and concentration: Poor  Language: expressive aphasia    Although he knew the month and day of his birthday, he did not know his birth year, instead stating 1972.    MoCA ( 08/25/2021): 9/30     Blind MoCA ( 05/27/2020): 7/22     MoCA ( 10/09/2017): 20/30 (-2 visuospatial/executive, -1 naming, -2 language, -1 abstraction, -4 delayed recall)  \"f\"=18, \"animals\"= 20  4/5 words on delayed recall with cues MIS: 6/15     Formulation: Mr. TINSLEY is a very pleasant 79 year old, right-handed, , white, man with no family history of neuropsychiatric illness and a past personal history of depression and Alzheimer's disease who presents for a follow-up visit.   He has continued to progress from a cognitive and functional standpoint as expected for his diagnosis.  He remains quite physically active and capable which is contributing to a great deal of caregiver burden.      Diagnosis:  Probable Alzheimer's disease, moderate severity  Depression, not otherwise specified, in remission  Insomnia    Plan:  - I agree with placement  - follow-up with me in about 6 months   "

## 2024-01-17 NOTE — PATIENT INSTRUCTIONS
- I agree with placement  - follow-up with me in about 6 months    General brain health guidelines:  - make sure your other medical conditions are well controlled (e.g., high blood pressure, high cholesterol, diabetes, sleep apnea etc)  - do not smoke or chew tobacco  - address any sensory deficits (e.g., proper glasses for poor eyesight, hearing aids for hearing loss)  - use a weekly pill box  - eat a heart healthy diet (e.g., lots of fruits and vegetables, low fat and cholesterol)  - exercise at least four days per week, 30 minutes at a time at an intensity that would make it difficult to converse with someone   - make sure you are getting at least 7 hours of quality sleep per night  - keep yourself mentally active daily by reading, playing cards, doing word searches, puzzles, etc.  - stay socially active by being part of a group or organization    Please note that the above may not be an entirely accurate or complete list of your medications, allergies, past medical history, past surgical history, or active problems as the document is completed following your visit.

## 2024-01-18 ENCOUNTER — TREATMENT (OUTPATIENT)
Dept: PHYSICAL THERAPY | Facility: CLINIC | Age: 79
End: 2024-01-18
Payer: MEDICARE

## 2024-01-18 PROCEDURE — 97112 NEUROMUSCULAR REEDUCATION: CPT | Mod: GP,CQ

## 2024-01-18 PROCEDURE — 97110 THERAPEUTIC EXERCISES: CPT | Mod: GP,CQ

## 2024-01-18 ASSESSMENT — PAIN SCALES - GENERAL: PAINLEVEL_OUTOF10: 0 - NO PAIN

## 2024-01-18 ASSESSMENT — PAIN - FUNCTIONAL ASSESSMENT: PAIN_FUNCTIONAL_ASSESSMENT: 0-10

## 2024-01-18 NOTE — PROGRESS NOTES
Physical Therapy  Physical Therapy Treatment    Patient Name: Darius Navarrete  MRN: [id number]  Today's Date: 2024  Time Calculation  Start Time: 1355  Stop Time: 1440  Time Calculation (min): 45 min    Insurance:  Visit number: 4 of [place]   (4 visits in )  Authorization info: No auth  Insurance Type: Medicare and AARP Plan F  Medicare Certification Period: Beginnin23 Ending:  3/17/2024  Onset date: 23     General:  Reason for visit: Lumbar Pain  Referred by: Anahi Trivedi DO       Current Problem  No diagnosis found.      Precautions  Precautions Comment: Alzheimers    Pain Assessment: 0-10  Pain Score: 0 - No pain    Subjective:   Patient reports having no back pain again today.    HEP Performed:  Yes    Objective:   Lumbar ROM- L. Flex/Ext WNL/20°    Treatments:   Bike (seat#3)- 3'  DBE 2x1.5'  Bosu lunge alternating- 1.5'  KB 4kg tandem stand R/L, L/R cw/ccw- 1' ea dir.  Hamstring curl 30# 1x30  Hip ab/ad 32.5#/32.5# 2x20 ea.  Multihip ext 33# R/L x 25 ea.  Cybex 3 step walkout 5# R/L x 10 ea.  SS R/L Quad/HF/Pirif- 1.5' ea.    Charges: TE 2, NME 1 (CQ Modifier)    Assessment:   Has one dry needle session left.    Plan:   Continue decreasing scar sensitivity utilizing dry needling for 4-6 visits and increasing lumbar rom, flexibility, mobility, balance, strength and function with land based exercise.    Patrick Cueto, PTA

## 2024-01-19 ENCOUNTER — APPOINTMENT (OUTPATIENT)
Dept: PHYSICAL THERAPY | Facility: CLINIC | Age: 79
End: 2024-01-19
Payer: MEDICARE

## 2024-01-22 ENCOUNTER — TREATMENT (OUTPATIENT)
Dept: PHYSICAL THERAPY | Facility: CLINIC | Age: 79
End: 2024-01-22
Payer: MEDICARE

## 2024-01-22 DIAGNOSIS — M54.9 BACK PAIN: Primary | ICD-10-CM

## 2024-01-22 DIAGNOSIS — L90.5 SCAR PAIN: ICD-10-CM

## 2024-01-22 PROCEDURE — 97140 MANUAL THERAPY 1/> REGIONS: CPT | Mod: GP

## 2024-01-22 ASSESSMENT — PAIN - FUNCTIONAL ASSESSMENT: PAIN_FUNCTIONAL_ASSESSMENT: 0-10

## 2024-01-22 ASSESSMENT — PAIN SCALES - GENERAL: PAINLEVEL_OUTOF10: 0 - NO PAIN

## 2024-01-22 NOTE — PROGRESS NOTES
Physical Therapy  Physical Therapy Treatment    Patient Name: Darius Navarrete  MRN: [id number]  Today's Date: 2024  Time Calculation  Start Time: 1300  Stop Time: 1315  Time Calculation (min): 15 min    Insurance:  [place] number: 5 of [place]   (4 visits in )  Authorization info: No auth  Insurance Type: Medicare and AARP Plan F  Medicare Certification Period: Beginnin23 Ending:  3/17/2024  Onset date: 23     General:  Reason for visit: Lumbar Pain  Referred by: Anahi Trivedi DO       Current Problem  1. Back pain        2. Scar pain            Precautions  Precautions Comment: Alzheimers    Pain Assessment: 0-10  Pain Score: 0 - No pain    Subjective:   Patient and wife report no back pain today. Complains some days but can go several days without saying anything.    HEP Performed:  Yes    Objective:   Palpation: hypertonic lumbar paraspinals    Treatments:   Dry needlin mm to lumbar paraspinals     Charges: Man x1     Assessment: Pt with TPR in B lumbar paraspinals today.      Plan: Today was last dry needling visit.    Linda Cavanaugh, PT

## 2024-02-06 ENCOUNTER — DOCUMENTATION (OUTPATIENT)
Dept: PRIMARY CARE | Facility: CLINIC | Age: 79
End: 2024-02-06
Payer: MEDICARE

## 2024-02-06 DIAGNOSIS — F02.80 ALZHEIMER DISEASE (MULTI): ICD-10-CM

## 2024-02-06 DIAGNOSIS — G30.9 ALZHEIMER DISEASE (MULTI): ICD-10-CM

## 2024-02-06 DIAGNOSIS — F32.A DEPRESSION, UNSPECIFIED DEPRESSION TYPE: ICD-10-CM

## 2024-02-06 PROCEDURE — 99490 CHRNC CARE MGMT STAFF 1ST 20: CPT | Performed by: INTERNAL MEDICINE

## 2024-02-06 NOTE — PROGRESS NOTES
CCM outreach with pts wife Kelley, pt identified by name and .    LOV: 23  NOV: 24    Health Maintenance Due: up to date    Goals for Alheimers:   Promoting the patient's safety   [place] in self-care activities   Reducing anxiety and agitation   Improving socialization and intimacy adequate nutrition    Supporting and educating the family caregivers.    Goals for Depression:  Monitor mood and behavior changes.  Administer prescribed antidepressant medication.  Facilitate regular psychotherapy sessions.  Provide education on depression management.  Stick to a daily schedule  Call support group or person   Keep a healthy weight and eat healthy diet options    Spoke with pts wife Kelley who states pts mood is good and that his memory is about the same.  Last month she reported he was becoming worse but this month is stable about the same.  Pts family continues to struggle with the thought of possible memory care facility for the pt.  Support offered to pts wife.  Kelley states they are currently in [place] for a vacation.    Medications reviewed no refills requested.  Pts wife verbalizes understanding and is in agreement with POC.  Encouraged to reach out with any healthcare needs.

## 2024-03-03 DIAGNOSIS — E78.5 HYPERLIPIDEMIA, UNSPECIFIED HYPERLIPIDEMIA TYPE: Primary | ICD-10-CM

## 2024-03-04 RX ORDER — ATORVASTATIN CALCIUM 40 MG/1
40 TABLET, FILM COATED ORAL DAILY
Qty: 90 TABLET | Refills: 3 | Status: SHIPPED | OUTPATIENT
Start: 2024-03-04

## 2024-03-11 ENCOUNTER — PATIENT OUTREACH (OUTPATIENT)
Dept: PRIMARY CARE | Facility: CLINIC | Age: 79
End: 2024-03-11
Payer: MEDICARE

## 2024-03-11 DIAGNOSIS — F02.80 ALZHEIMER DISEASE (MULTI): ICD-10-CM

## 2024-03-11 DIAGNOSIS — G30.9 ALZHEIMER DISEASE (MULTI): ICD-10-CM

## 2024-03-11 DIAGNOSIS — F32.A DEPRESSION, UNSPECIFIED DEPRESSION TYPE: ICD-10-CM

## 2024-03-11 PROCEDURE — 99490 CHRNC CARE MGMT STAFF 1ST 20: CPT | Performed by: INTERNAL MEDICINE

## 2024-03-11 NOTE — PROGRESS NOTES
CCM outreach with pt identified by name and , spoke wit hpts wife Kelley.    LOV: 23  NOV: 24    Health Maintenance Due: up to date    Goals for Alheimers:   Promoting the patient's safety   [place] in self-care activities   Reducing anxiety and agitation   Improving socialization and intimacy adequate nutrition    Supporting and educating the family caregivers.    Goals for Depression:  Monitor mood and behavior changes.  Administer prescribed antidepressant medication.  Facilitate regular psychotherapy sessions.  Provide education on depression management.  Stick to a daily schedule  Call support group or person   Keep a healthy weight and eat healthy diet options    Spoke with pts wife Kelley regarding pt who states pts memory continues to decline rapidly.  Pts wife and her family have decided to place pt in memory care facility next month because pts wife can no longer care for him.  Offered support to pts wife and reassured her of their decision.  Discussed following pt until next month when he is admitted for any further needs and she agreed that would be beneficial.    Medications reviewed no refills requested.  Pts wife verbalized understanding and is in agreement with POC.  Encouraged pts wife to reach out with any healthcare concerns until next month when pt enters memory care.

## 2024-03-14 DIAGNOSIS — Z11.1 ENCOUNTER FOR SCREENING FOR RESPIRATORY TUBERCULOSIS: Primary | ICD-10-CM

## 2024-03-14 DIAGNOSIS — Z11.1 SCREENING FOR TUBERCULOSIS: ICD-10-CM

## 2024-03-19 DIAGNOSIS — Z00.6 RESEARCH STUDY PATIENT: Primary | ICD-10-CM

## 2024-03-20 ENCOUNTER — OFFICE VISIT (OUTPATIENT)
Dept: NEUROLOGY | Facility: CLINIC | Age: 79
End: 2024-03-20

## 2024-03-20 DIAGNOSIS — Z00.6 RESEARCH STUDY PATIENT: ICD-10-CM

## 2024-03-20 LAB
BASOPHILS # BLD AUTO: 0.04 X10*3/UL (ref 0–0.1)
BASOPHILS NFR BLD AUTO: 0.8 %
CRP SERPL HS-MCNC: 0.2 MG/L
EOSINOPHIL # BLD AUTO: 0.05 X10*3/UL (ref 0–0.4)
EOSINOPHIL NFR BLD AUTO: 1.1 %
ERYTHROCYTE [DISTWIDTH] IN BLOOD BY AUTOMATED COUNT: 15.2 % (ref 11.5–14.5)
ERYTHROCYTE [SEDIMENTATION RATE] IN BLOOD BY WESTERGREN METHOD: 6 MM/H (ref 0–20)
HCT VFR BLD AUTO: 43.4 % (ref 41–52)
HGB BLD-MCNC: 14 G/DL (ref 13.5–17.5)
IMM GRANULOCYTES # BLD AUTO: 0.01 X10*3/UL (ref 0–0.5)
IMM GRANULOCYTES NFR BLD AUTO: 0.2 % (ref 0–0.9)
LYMPHOCYTES # BLD AUTO: 1.47 X10*3/UL (ref 0.8–3)
LYMPHOCYTES NFR BLD AUTO: 30.9 %
MCH RBC QN AUTO: 28.3 PG (ref 26–34)
MCHC RBC AUTO-ENTMCNC: 32.3 G/DL (ref 32–36)
MCV RBC AUTO: 88 FL (ref 80–100)
MONOCYTES # BLD AUTO: 0.53 X10*3/UL (ref 0.05–0.8)
MONOCYTES NFR BLD AUTO: 11.2 %
NEUTROPHILS # BLD AUTO: 2.65 X10*3/UL (ref 1.6–5.5)
NEUTROPHILS NFR BLD AUTO: 55.8 %
NRBC BLD-RTO: 0 /100 WBCS (ref 0–0)
PLATELET # BLD AUTO: 152 X10*3/UL (ref 150–450)
RBC # BLD AUTO: 4.94 X10*6/UL (ref 4.5–5.9)
WBC # BLD AUTO: 4.8 X10*3/UL (ref 4.4–11.3)

## 2024-03-20 PROCEDURE — 99214 OFFICE O/P EST MOD 30 MIN: CPT | Performed by: PSYCHIATRY & NEUROLOGY

## 2024-03-20 PROCEDURE — 36415 COLL VENOUS BLD VENIPUNCTURE: CPT | Performed by: PSYCHIATRY & NEUROLOGY

## 2024-03-20 PROCEDURE — 1159F MED LIST DOCD IN RCRD: CPT | Performed by: PSYCHIATRY & NEUROLOGY

## 2024-03-20 PROCEDURE — 1123F ACP DISCUSS/DSCN MKR DOCD: CPT | Performed by: PSYCHIATRY & NEUROLOGY

## 2024-03-20 PROCEDURE — 85652 RBC SED RATE AUTOMATED: CPT | Performed by: PSYCHIATRY & NEUROLOGY

## 2024-03-20 PROCEDURE — 86141 C-REACTIVE PROTEIN HS: CPT | Performed by: PSYCHIATRY & NEUROLOGY

## 2024-03-20 PROCEDURE — 85025 COMPLETE CBC W/AUTO DIFF WBC: CPT | Performed by: PSYCHIATRY & NEUROLOGY

## 2024-03-20 PROCEDURE — 1036F TOBACCO NON-USER: CPT | Performed by: PSYCHIATRY & NEUROLOGY

## 2024-03-20 PROCEDURE — 1157F ADVNC CARE PLAN IN RCRD: CPT | Performed by: PSYCHIATRY & NEUROLOGY

## 2024-04-01 ENCOUNTER — DOCUMENTATION (OUTPATIENT)
Dept: PRIMARY CARE | Facility: CLINIC | Age: 79
End: 2024-04-01
Payer: MEDICARE

## 2024-04-01 NOTE — PROGRESS NOTES
Spoke to pts  wife Kelley pt is moving into [facility] this weekend and will be followed by the physicians at the facility.  Pts wife is aware pt will no longer be followed by me and verbalizes understanding.

## 2024-04-09 ENCOUNTER — TELEPHONE (OUTPATIENT)
Dept: NEUROLOGY | Facility: CLINIC | Age: 79
End: 2024-04-09
Payer: MEDICARE

## 2024-05-09 ENCOUNTER — APPOINTMENT (OUTPATIENT)
Dept: PRIMARY CARE | Facility: CLINIC | Age: 79
End: 2024-05-09
Payer: MEDICARE

## 2024-05-21 ENCOUNTER — TELEPHONE (OUTPATIENT)
Dept: NEUROLOGY | Facility: CLINIC | Age: 79
End: 2024-05-21
Payer: MEDICARE

## 2024-07-09 DIAGNOSIS — I20.1 PRINZMETAL'S ANGINA (CMS-HCC): Primary | ICD-10-CM

## 2024-07-09 RX ORDER — ISOSORBIDE MONONITRATE 30 MG/1
30 TABLET, EXTENDED RELEASE ORAL
Qty: 90 TABLET | Refills: 3 | Status: SHIPPED | OUTPATIENT
Start: 2024-07-09

## 2024-08-09 DIAGNOSIS — F02.80 ALZHEIMER DISEASE (MULTI): ICD-10-CM

## 2024-08-09 DIAGNOSIS — G30.9 ALZHEIMER DISEASE (MULTI): ICD-10-CM

## 2024-08-09 RX ORDER — QUETIAPINE FUMARATE 25 MG/1
TABLET, FILM COATED ORAL
Qty: 90 TABLET | Refills: 3 | Status: SHIPPED | OUTPATIENT
Start: 2024-08-09

## 2024-12-03 ENCOUNTER — APPOINTMENT (OUTPATIENT)
Dept: CARDIOLOGY | Facility: HOSPITAL | Age: 79
End: 2024-12-03
Payer: MEDICARE